# Patient Record
Sex: FEMALE | Race: BLACK OR AFRICAN AMERICAN | NOT HISPANIC OR LATINO | Employment: FULL TIME | URBAN - METROPOLITAN AREA
[De-identification: names, ages, dates, MRNs, and addresses within clinical notes are randomized per-mention and may not be internally consistent; named-entity substitution may affect disease eponyms.]

---

## 2023-03-15 PROBLEM — M79.605 PAIN IN BOTH LOWER EXTREMITIES: Status: ACTIVE | Noted: 2023-03-15

## 2023-03-15 PROBLEM — I10 HYPERTENSION: Status: ACTIVE | Noted: 2023-03-15

## 2023-03-15 PROBLEM — N93.9 ABNORMAL UTERINE BLEEDING: Status: ACTIVE | Noted: 2023-03-15

## 2023-03-15 PROBLEM — R25.2 CALF CRAMP: Status: ACTIVE | Noted: 2023-03-15

## 2023-03-15 PROBLEM — M70.21 OLECRANON BURSITIS OF RIGHT ELBOW: Status: ACTIVE | Noted: 2023-03-15

## 2023-03-15 PROBLEM — R73.9 HYPERGLYCEMIA: Status: ACTIVE | Noted: 2023-03-15

## 2023-03-15 PROBLEM — R07.9 CHEST PAIN: Status: ACTIVE | Noted: 2023-03-15

## 2023-03-15 PROBLEM — E55.9 VITAMIN D DEFICIENCY: Status: ACTIVE | Noted: 2023-03-15

## 2023-03-15 PROBLEM — M50.90 CERVICAL NECK PAIN WITH EVIDENCE OF DISC DISEASE: Status: ACTIVE | Noted: 2023-03-15

## 2023-03-15 PROBLEM — R42 DIZZINESS: Status: ACTIVE | Noted: 2023-03-15

## 2023-03-15 PROBLEM — M54.12 CERVICAL RADICULOPATHY: Status: ACTIVE | Noted: 2023-03-15

## 2023-03-15 PROBLEM — M54.50 LOW BACK PAIN: Status: ACTIVE | Noted: 2023-03-15

## 2023-03-15 PROBLEM — M25.562 BILATERAL KNEE PAIN: Status: ACTIVE | Noted: 2023-03-15

## 2023-03-15 PROBLEM — R51.9 HEADACHE: Status: ACTIVE | Noted: 2023-03-15

## 2023-03-15 PROBLEM — D64.9 ANEMIA: Status: ACTIVE | Noted: 2023-03-15

## 2023-03-15 PROBLEM — G89.29 CHRONIC MIDLINE LOW BACK PAIN: Status: ACTIVE | Noted: 2023-03-15

## 2023-03-15 PROBLEM — J18.9 PNEUMONIA: Status: ACTIVE | Noted: 2023-03-15

## 2023-03-15 PROBLEM — M79.604 PAIN IN BOTH LOWER EXTREMITIES: Status: ACTIVE | Noted: 2023-03-15

## 2023-03-15 PROBLEM — N94.6 DYSMENORRHEA: Status: ACTIVE | Noted: 2023-03-15

## 2023-03-15 PROBLEM — G56.22 ULNAR NEUROPATHY OF LEFT UPPER EXTREMITY: Status: ACTIVE | Noted: 2023-03-15

## 2023-03-15 PROBLEM — I10 BENIGN HYPERTENSION: Status: ACTIVE | Noted: 2023-03-15

## 2023-03-15 PROBLEM — M54.50 CHRONIC MIDLINE LOW BACK PAIN: Status: ACTIVE | Noted: 2023-03-15

## 2023-03-15 PROBLEM — R60.0 BILATERAL LEG EDEMA: Status: ACTIVE | Noted: 2023-03-15

## 2023-03-15 PROBLEM — M25.561 BILATERAL KNEE PAIN: Status: ACTIVE | Noted: 2023-03-15

## 2023-03-15 RX ORDER — CHLORTHALIDONE 25 MG/1
1 TABLET ORAL DAILY
COMMUNITY
Start: 2022-06-20 | End: 2023-07-07 | Stop reason: SDUPTHER

## 2023-03-15 RX ORDER — VIT C/E/ZN/COPPR/LUTEIN/ZEAXAN 250MG-90MG
25 CAPSULE ORAL DAILY
COMMUNITY
End: 2023-07-07 | Stop reason: SDUPTHER

## 2023-03-15 RX ORDER — NAPROXEN 500 MG/1
TABLET ORAL
COMMUNITY
Start: 2020-04-08 | End: 2023-07-07 | Stop reason: SDUPTHER

## 2023-03-15 RX ORDER — NORETHINDRONE 0.35 MG/1
1 TABLET ORAL DAILY
COMMUNITY
Start: 2021-09-30 | End: 2023-07-12 | Stop reason: ALTCHOICE

## 2023-03-16 ENCOUNTER — APPOINTMENT (OUTPATIENT)
Dept: PRIMARY CARE | Facility: CLINIC | Age: 43
End: 2023-03-16
Payer: COMMERCIAL

## 2023-07-07 DIAGNOSIS — M54.40 CHRONIC LOW BACK PAIN WITH SCIATICA, SCIATICA LATERALITY UNSPECIFIED, UNSPECIFIED BACK PAIN LATERALITY: ICD-10-CM

## 2023-07-07 DIAGNOSIS — I10 BENIGN HYPERTENSION: ICD-10-CM

## 2023-07-07 DIAGNOSIS — G89.29 CHRONIC LOW BACK PAIN WITH SCIATICA, SCIATICA LATERALITY UNSPECIFIED, UNSPECIFIED BACK PAIN LATERALITY: ICD-10-CM

## 2023-07-07 DIAGNOSIS — E55.9 VITAMIN D DEFICIENCY: ICD-10-CM

## 2023-07-07 RX ORDER — NAPROXEN 500 MG/1
TABLET ORAL
Qty: 14 TABLET | Refills: 0 | Status: SHIPPED | OUTPATIENT
Start: 2023-07-07 | End: 2024-01-31 | Stop reason: SDUPTHER

## 2023-07-07 RX ORDER — VIT C/E/ZN/COPPR/LUTEIN/ZEAXAN 250MG-90MG
25 CAPSULE ORAL DAILY
Qty: 90 CAPSULE | Refills: 0 | Status: SHIPPED | OUTPATIENT
Start: 2023-07-07

## 2023-07-07 RX ORDER — NAPROXEN 500 MG/1
TABLET ORAL
Qty: 90 TABLET | Refills: 0 | Status: SHIPPED | OUTPATIENT
Start: 2023-07-07 | End: 2023-07-07 | Stop reason: SDUPTHER

## 2023-07-07 RX ORDER — CHLORTHALIDONE 25 MG/1
25 TABLET ORAL DAILY
Qty: 90 TABLET | Refills: 0 | Status: SHIPPED | OUTPATIENT
Start: 2023-07-07 | End: 2023-11-06 | Stop reason: SDUPTHER

## 2023-07-12 ENCOUNTER — OFFICE VISIT (OUTPATIENT)
Dept: PRIMARY CARE | Facility: CLINIC | Age: 43
End: 2023-07-12
Payer: COMMERCIAL

## 2023-07-12 ENCOUNTER — LAB (OUTPATIENT)
Dept: LAB | Facility: LAB | Age: 43
End: 2023-07-12
Payer: COMMERCIAL

## 2023-07-12 VITALS
HEIGHT: 61 IN | WEIGHT: 170 LBS | SYSTOLIC BLOOD PRESSURE: 120 MMHG | DIASTOLIC BLOOD PRESSURE: 80 MMHG | BODY MASS INDEX: 32.1 KG/M2

## 2023-07-12 DIAGNOSIS — I10 BENIGN HYPERTENSION: ICD-10-CM

## 2023-07-12 DIAGNOSIS — E55.9 VITAMIN D DEFICIENCY: ICD-10-CM

## 2023-07-12 DIAGNOSIS — Z00.00 HEALTH MAINTENANCE EXAMINATION: ICD-10-CM

## 2023-07-12 DIAGNOSIS — Z00.00 HEALTH MAINTENANCE EXAMINATION: Primary | ICD-10-CM

## 2023-07-12 DIAGNOSIS — Z12.31 ENCOUNTER FOR SCREENING MAMMOGRAM FOR MALIGNANT NEOPLASM OF BREAST: ICD-10-CM

## 2023-07-12 LAB
ALANINE AMINOTRANSFERASE (SGPT) (U/L) IN SER/PLAS: 19 U/L (ref 7–45)
ALBUMIN (G/DL) IN SER/PLAS: 4.5 G/DL (ref 3.4–5)
ALBUMIN (MG/L) IN URINE: <7 MG/L
ALBUMIN/CREATININE (UG/MG) IN URINE: NORMAL UG/MG CRT (ref 0–30)
ALKALINE PHOSPHATASE (U/L) IN SER/PLAS: 62 U/L (ref 33–110)
ANION GAP IN SER/PLAS: 14 MMOL/L (ref 10–20)
ASPARTATE AMINOTRANSFERASE (SGOT) (U/L) IN SER/PLAS: 17 U/L (ref 9–39)
BILIRUBIN TOTAL (MG/DL) IN SER/PLAS: 0.3 MG/DL (ref 0–1.2)
CALCIUM (MG/DL) IN SER/PLAS: 10 MG/DL (ref 8.6–10.6)
CARBON DIOXIDE, TOTAL (MMOL/L) IN SER/PLAS: 27 MMOL/L (ref 21–32)
CHLORIDE (MMOL/L) IN SER/PLAS: 101 MMOL/L (ref 98–107)
CHOLESTEROL (MG/DL) IN SER/PLAS: 188 MG/DL (ref 0–199)
CHOLESTEROL IN HDL (MG/DL) IN SER/PLAS: 55.1 MG/DL
CHOLESTEROL/HDL RATIO: 3.4
CREATININE (MG/DL) IN SER/PLAS: 0.67 MG/DL (ref 0.5–1.05)
CREATININE (MG/DL) IN URINE: 182 MG/DL (ref 20–320)
ERYTHROCYTE DISTRIBUTION WIDTH (RATIO) BY AUTOMATED COUNT: 12.5 % (ref 11.5–14.5)
ERYTHROCYTE MEAN CORPUSCULAR HEMOGLOBIN CONCENTRATION (G/DL) BY AUTOMATED: 32.8 G/DL (ref 32–36)
ERYTHROCYTE MEAN CORPUSCULAR VOLUME (FL) BY AUTOMATED COUNT: 88 FL (ref 80–100)
ERYTHROCYTES (10*6/UL) IN BLOOD BY AUTOMATED COUNT: 4.69 X10E12/L (ref 4–5.2)
ESTIMATED AVERAGE GLUCOSE FOR HBA1C: 123 MG/DL
GFR FEMALE: >90 ML/MIN/1.73M2
GLUCOSE (MG/DL) IN SER/PLAS: 88 MG/DL (ref 74–99)
HEMATOCRIT (%) IN BLOOD BY AUTOMATED COUNT: 41.1 % (ref 36–46)
HEMOGLOBIN (G/DL) IN BLOOD: 13.5 G/DL (ref 12–16)
HEMOGLOBIN A1C/HEMOGLOBIN TOTAL IN BLOOD: 5.9 %
LDL: 117 MG/DL (ref 0–99)
LEUKOCYTES (10*3/UL) IN BLOOD BY AUTOMATED COUNT: 7 X10E9/L (ref 4.4–11.3)
NRBC (PER 100 WBCS) BY AUTOMATED COUNT: 0 /100 WBC (ref 0–0)
PLATELETS (10*3/UL) IN BLOOD AUTOMATED COUNT: 361 X10E9/L (ref 150–450)
POTASSIUM (MMOL/L) IN SER/PLAS: 3.9 MMOL/L (ref 3.5–5.3)
PROTEIN TOTAL: 7.9 G/DL (ref 6.4–8.2)
SODIUM (MMOL/L) IN SER/PLAS: 138 MMOL/L (ref 136–145)
THYROTROPIN (MIU/L) IN SER/PLAS BY DETECTION LIMIT <= 0.05 MIU/L: 1 MIU/L (ref 0.44–3.98)
TRIGLYCERIDE (MG/DL) IN SER/PLAS: 81 MG/DL (ref 0–149)
UREA NITROGEN (MG/DL) IN SER/PLAS: 8 MG/DL (ref 6–23)
VLDL: 16 MG/DL (ref 0–40)

## 2023-07-12 PROCEDURE — 3079F DIAST BP 80-89 MM HG: CPT

## 2023-07-12 PROCEDURE — 82043 UR ALBUMIN QUANTITATIVE: CPT

## 2023-07-12 PROCEDURE — 80061 LIPID PANEL: CPT

## 2023-07-12 PROCEDURE — 99396 PREV VISIT EST AGE 40-64: CPT

## 2023-07-12 PROCEDURE — 83036 HEMOGLOBIN GLYCOSYLATED A1C: CPT

## 2023-07-12 PROCEDURE — 80053 COMPREHEN METABOLIC PANEL: CPT

## 2023-07-12 PROCEDURE — 1036F TOBACCO NON-USER: CPT

## 2023-07-12 PROCEDURE — 84443 ASSAY THYROID STIM HORMONE: CPT

## 2023-07-12 PROCEDURE — 3074F SYST BP LT 130 MM HG: CPT

## 2023-07-12 PROCEDURE — 82570 ASSAY OF URINE CREATININE: CPT

## 2023-07-12 PROCEDURE — 85027 COMPLETE CBC AUTOMATED: CPT

## 2023-07-12 PROCEDURE — 36415 COLL VENOUS BLD VENIPUNCTURE: CPT

## 2023-07-12 ASSESSMENT — PATIENT HEALTH QUESTIONNAIRE - PHQ9
SUM OF ALL RESPONSES TO PHQ9 QUESTIONS 1 AND 2: 0
2. FEELING DOWN, DEPRESSED OR HOPELESS: NOT AT ALL
SUM OF ALL RESPONSES TO PHQ9 QUESTIONS 1 & 2: 0
2. FEELING DOWN, DEPRESSED OR HOPELESS: NOT AT ALL
1. LITTLE INTEREST OR PLEASURE IN DOING THINGS: NOT AT ALL
1. LITTLE INTEREST OR PLEASURE IN DOING THINGS: NOT AT ALL

## 2023-07-12 NOTE — PROGRESS NOTES
"Subjective   Patient ID: Christi Calzada is a 42 y.o. female who presents for Follow-up.  HPI  42 year old female with PMH of HTN presents today for follow up.     HTN: Chlorthalidone 25mg    Reports that she has chronic insomnia. Not significantly bothered during the day by anxiety but feels it is bothersome during the day. Has tried tylenol PM without significant relief. Has also tried melatonin. Not interested in other medication, CBT, or sleep medicine referral at this time. Does not snore, no significant daytime fatigue, no known apneic periods during night, no waking up SOB, low concern for ANAIS.     All systems have been reviewed and are negative for complaint other than those mentioned in the HPI.     Objective   /80 (BP Location: Left arm, Patient Position: Sitting, BP Cuff Size: Adult)   Ht 1.549 m (5' 1\")   Wt 77.1 kg (170 lb)   BMI 32.12 kg/m²    Physical Exam  Constitutional:       General: She is awake.      Appearance: Normal appearance.   HENT:      Head: Normocephalic and atraumatic.   Eyes:      Extraocular Movements: Extraocular movements intact.      Conjunctiva/sclera: Conjunctivae normal.      Pupils: Pupils are equal, round, and reactive to light.   Neck:      Thyroid: No thyroid mass or thyromegaly.      Vascular: No carotid bruit.   Cardiovascular:      Rate and Rhythm: Normal rate and regular rhythm.      Pulses: Normal pulses.      Heart sounds: S1 normal and S2 normal. No murmur heard.  Pulmonary:      Effort: Pulmonary effort is normal.      Breath sounds: Normal breath sounds.   Abdominal:      General: Abdomen is flat. Bowel sounds are normal.      Palpations: Abdomen is soft. There is no hepatomegaly, splenomegaly, mass or pulsatile mass.      Tenderness: There is no abdominal tenderness.   Musculoskeletal:      Cervical back: Normal range of motion and neck supple.      Right lower leg: No edema.      Left lower leg: No edema.   Skin:     General: Skin is warm and dry.      " Capillary Refill: Capillary refill takes less than 2 seconds.   Neurological:      General: No focal deficit present.      Mental Status: She is alert and oriented to person, place, and time.   Psychiatric:         Mood and Affect: Mood normal.         Behavior: Behavior normal. Behavior is cooperative.         Thought Content: Thought content normal.         Judgment: Judgment normal.     Christi was seen today for follow-up.  Diagnoses and all orders for this visit:  Health maintenance examination (Primary)  -    Due for pap, patient to schedule with OBGYN  - Due for mammo, ordered  - Start colon cancer screening at 46  - Immunizations UTD  - CBC; Future  -     Comprehensive Metabolic Panel; Future  -     Hemoglobin A1C; Future  -     Lipid Panel; Future  -     TSH with reflex to Free T4 if abnormal; Future  -     Albumin, urine, random; Future  Encounter for screening mammogram for malignant neoplasm of breast  -     BI mammo bilateral screening tomosynthesis; Future  Benign hypertension   - Stable, continue current medication.    - CMP and urine albumin today  Vitamin D deficiency   - stable, taking vit D supplement daily, continue.     Follow up in 6 months.

## 2023-08-01 ENCOUNTER — OFFICE VISIT (OUTPATIENT)
Dept: PRIMARY CARE | Facility: CLINIC | Age: 43
End: 2023-08-01
Payer: COMMERCIAL

## 2023-08-01 VITALS
SYSTOLIC BLOOD PRESSURE: 135 MMHG | DIASTOLIC BLOOD PRESSURE: 82 MMHG | WEIGHT: 171 LBS | HEIGHT: 61 IN | BODY MASS INDEX: 32.28 KG/M2

## 2023-08-01 DIAGNOSIS — M25.512 ACUTE PAIN OF LEFT SHOULDER: Primary | ICD-10-CM

## 2023-08-01 DIAGNOSIS — I10 BENIGN HYPERTENSION: ICD-10-CM

## 2023-08-01 PROBLEM — R60.0 BILATERAL LEG EDEMA: Status: RESOLVED | Noted: 2023-03-15 | Resolved: 2023-08-01

## 2023-08-01 PROBLEM — R25.2 CALF CRAMP: Status: RESOLVED | Noted: 2023-03-15 | Resolved: 2023-08-01

## 2023-08-01 PROBLEM — R07.9 CHEST PAIN: Status: RESOLVED | Noted: 2023-03-15 | Resolved: 2023-08-01

## 2023-08-01 PROBLEM — G89.29 CHRONIC MIDLINE LOW BACK PAIN: Status: RESOLVED | Noted: 2023-03-15 | Resolved: 2023-08-01

## 2023-08-01 PROBLEM — M25.562 BILATERAL KNEE PAIN: Status: RESOLVED | Noted: 2023-03-15 | Resolved: 2023-08-01

## 2023-08-01 PROBLEM — M50.90 CERVICAL NECK PAIN WITH EVIDENCE OF DISC DISEASE: Status: RESOLVED | Noted: 2023-03-15 | Resolved: 2023-08-01

## 2023-08-01 PROBLEM — M79.604 PAIN IN BOTH LOWER EXTREMITIES: Status: RESOLVED | Noted: 2023-03-15 | Resolved: 2023-08-01

## 2023-08-01 PROBLEM — M70.21 OLECRANON BURSITIS OF RIGHT ELBOW: Status: RESOLVED | Noted: 2023-03-15 | Resolved: 2023-08-01

## 2023-08-01 PROBLEM — R51.9 HEADACHE: Status: RESOLVED | Noted: 2023-03-15 | Resolved: 2023-08-01

## 2023-08-01 PROBLEM — M54.50 CHRONIC MIDLINE LOW BACK PAIN: Status: RESOLVED | Noted: 2023-03-15 | Resolved: 2023-08-01

## 2023-08-01 PROBLEM — R73.9 HYPERGLYCEMIA: Status: RESOLVED | Noted: 2023-03-15 | Resolved: 2023-08-01

## 2023-08-01 PROBLEM — M79.605 PAIN IN BOTH LOWER EXTREMITIES: Status: RESOLVED | Noted: 2023-03-15 | Resolved: 2023-08-01

## 2023-08-01 PROBLEM — R42 DIZZINESS: Status: RESOLVED | Noted: 2023-03-15 | Resolved: 2023-08-01

## 2023-08-01 PROBLEM — J18.9 PNEUMONIA: Status: RESOLVED | Noted: 2023-03-15 | Resolved: 2023-08-01

## 2023-08-01 PROBLEM — M54.50 LOW BACK PAIN: Status: RESOLVED | Noted: 2023-03-15 | Resolved: 2023-08-01

## 2023-08-01 PROBLEM — M25.561 BILATERAL KNEE PAIN: Status: RESOLVED | Noted: 2023-03-15 | Resolved: 2023-08-01

## 2023-08-01 PROCEDURE — 1036F TOBACCO NON-USER: CPT

## 2023-08-01 PROCEDURE — 99213 OFFICE O/P EST LOW 20 MIN: CPT

## 2023-08-01 PROCEDURE — 3075F SYST BP GE 130 - 139MM HG: CPT

## 2023-08-01 PROCEDURE — 3079F DIAST BP 80-89 MM HG: CPT

## 2023-08-01 RX ORDER — CYCLOBENZAPRINE HCL 10 MG
10 TABLET ORAL NIGHTLY PRN
Qty: 30 TABLET | Refills: 0 | Status: SHIPPED | OUTPATIENT
Start: 2023-08-01 | End: 2024-01-31 | Stop reason: ALTCHOICE

## 2023-08-01 ASSESSMENT — PATIENT HEALTH QUESTIONNAIRE - PHQ9
1. LITTLE INTEREST OR PLEASURE IN DOING THINGS: NOT AT ALL
2. FEELING DOWN, DEPRESSED OR HOPELESS: NOT AT ALL
SUM OF ALL RESPONSES TO PHQ9 QUESTIONS 1 AND 2: 0

## 2023-08-01 NOTE — PROGRESS NOTES
"Subjective   Patient ID: Christi Calzada is a 42 y.o. female who presents for shoulder arm pain recurrent non work reated.  HPI  42 year old female with no significant PMH presents with left shoulder pain.   States that the pain started 2 days ago. Pain is localized in the left shoulder, radiates to left hand. Worse with ROM.   No known history of injury to the shoulder.   History of similar pain occurred 3 weeks ago, lasted 3 days and then resolved.   Took naproxen with good relief.   All systems have been reviewed and are negative for complaint other than those mentioned in the HPI.     Objective   /82 (BP Location: Left arm, Patient Position: Sitting, BP Cuff Size: Adult)   Ht 1.549 m (5' 1\")   Wt 77.6 kg (171 lb)   BMI 32.31 kg/m²    Physical Exam  Constitutional:       General: She is awake.      Appearance: Normal appearance.   HENT:      Head: Normocephalic and atraumatic.   Eyes:      Extraocular Movements: Extraocular movements intact.      Pupils: Pupils are equal, round, and reactive to light.   Cardiovascular:      Rate and Rhythm: Normal rate and regular rhythm.      Heart sounds: S1 normal and S2 normal. No murmur heard.  Pulmonary:      Effort: Pulmonary effort is normal.      Breath sounds: Normal breath sounds.   Musculoskeletal:      Cervical back: Normal range of motion and neck supple.      Right lower leg: No edema.      Left lower leg: No edema.   Skin:     General: Skin is warm and dry.   Neurological:      General: No focal deficit present.      Mental Status: She is alert and oriented to person, place, and time.   Psychiatric:         Mood and Affect: Mood and affect normal.         Behavior: Behavior normal. Behavior is cooperative.         Thought Content: Thought content normal.         Judgment: Judgment normal.     Christi was seen today for shoulder arm pain recurrent non work reated.  Diagnoses and all orders for this visit:  Acute pain of left shoulder (Primary)  - Unable to " perform active ROM abducting above 90 degrees, negative empty can test, likely frozen shoulder  - Atraumatic, though recurrent  - NSAID/muscle relaxer for pain, xray today, referral to ortho placed   -     Referral to Orthopaedic Surgery; Future  -     cyclobenzaprine (Flexeril) 10 mg tablet; Take 1 tablet (10 mg) by mouth as needed at bedtime for muscle spasms.  -     XR shoulder left 2+ views; Future  Benign hypertension   - Slightly above goal but at pain, continue current medication.     Follow up as regularly scheduled for chronic conditions.

## 2023-08-24 ENCOUNTER — HOSPITAL ENCOUNTER (OUTPATIENT)
Dept: DATA CONVERSION | Facility: HOSPITAL | Age: 43
Discharge: HOME | End: 2023-08-24
Payer: COMMERCIAL

## 2023-08-24 DIAGNOSIS — Z20.822 CONTACT WITH AND (SUSPECTED) EXPOSURE TO COVID-19: ICD-10-CM

## 2023-08-24 DIAGNOSIS — M79.10 MYALGIA, UNSPECIFIED SITE: ICD-10-CM

## 2023-08-24 DIAGNOSIS — R51.9 HEADACHE, UNSPECIFIED: ICD-10-CM

## 2023-08-24 DIAGNOSIS — H65.191 OTHER ACUTE NONSUPPURATIVE OTITIS MEDIA, RIGHT EAR: ICD-10-CM

## 2023-08-24 DIAGNOSIS — J02.9 ACUTE PHARYNGITIS, UNSPECIFIED: ICD-10-CM

## 2023-08-24 DIAGNOSIS — R05.9 COUGH, UNSPECIFIED: ICD-10-CM

## 2023-08-24 LAB
ALBUMIN SERPL-MCNC: 4.1 GM/DL (ref 3.5–5)
ALBUMIN/GLOB SERPL: 1 RATIO (ref 1.5–3)
ALP BLD-CCNC: 78 U/L (ref 35–125)
ALT SERPL-CCNC: 25 U/L (ref 5–40)
ANION GAP SERPL CALCULATED.3IONS-SCNC: 9 MMOL/L (ref 0–19)
AST SERPL-CCNC: 18 U/L (ref 5–40)
BACTERIA SPEC CULT: NORMAL
BACTERIA UR QL AUTO: PRESENT
BASOPHILS # BLD AUTO: 0.03 K/UL (ref 0–0.22)
BASOPHILS NFR BLD AUTO: 0.4 % (ref 0–1)
BILIRUB SERPL-MCNC: 0.6 MG/DL (ref 0.1–1.2)
BILIRUB UR QL STRIP.AUTO: NEGATIVE
BUN SERPL-MCNC: 7 MG/DL (ref 8–25)
BUN/CREAT SERPL: 10 RATIO (ref 8–21)
CALCIUM SERPL-MCNC: 9.5 MG/DL (ref 8.5–10.4)
CHLORIDE SERPL-SCNC: 102 MMOL/L (ref 97–107)
CLARITY UR: ABNORMAL
CO2 SERPL-SCNC: 27 MMOL/L (ref 24–31)
COLOR UR: YELLOW
CREAT SERPL-MCNC: 0.7 MG/DL (ref 0.4–1.6)
DEPRECATED RDW RBC AUTO: 38.1 FL (ref 37–54)
DEPRECATED S PYO AG THROAT QL EIA: NORMAL
DIFFERENTIAL METHOD BLD: ABNORMAL
EOSINOPHIL # BLD AUTO: 0.06 K/UL (ref 0–0.45)
EOSINOPHIL NFR BLD: 0.7 % (ref 0–3)
EPITH CASTS #/AREA UR COMP ASSIST: ABNORMAL /HPF
ERYTHROCYTE [DISTWIDTH] IN BLOOD BY AUTOMATED COUNT: 11.9 % (ref 11.7–15)
GFR SERPL CREATININE-BSD FRML MDRD: 111 ML/MIN/1.73 M2
GLOBULIN SER-MCNC: 4 G/DL (ref 1.9–3.7)
GLUCOSE SERPL-MCNC: 103 MG/DL (ref 65–99)
GLUCOSE UR STRIP.AUTO-MCNC: NEGATIVE MG/DL
HCG UR QL: NEGATIVE
HCT VFR BLD AUTO: 39.7 % (ref 36–44)
HGB BLD-MCNC: 13.4 GM/DL (ref 12–15)
HGB UR QL STRIP.AUTO: ABNORMAL /HPF (ref 0–3)
HGB UR QL: ABNORMAL
IMM GRANULOCYTES # BLD AUTO: 0.01 K/UL (ref 0–0.1)
KETONES UR QL STRIP.AUTO: NEGATIVE
LEUKOCYTE ESTERASE UR QL STRIP.AUTO: NEGATIVE
LYMPHOCYTES # BLD AUTO: 1.27 K/UL (ref 1.2–3.2)
LYMPHOCYTES NFR BLD MANUAL: 14.8 % (ref 20–40)
MCH RBC QN AUTO: 29.1 PG (ref 26–34)
MCHC RBC AUTO-ENTMCNC: 33.8 % (ref 31–37)
MCV RBC AUTO: 86.1 FL (ref 80–100)
MICRO URNS: ABNORMAL
MICROSCOPIC (UA): ABNORMAL
MONOCYTES # BLD AUTO: 0.54 K/UL (ref 0–0.8)
MONOCYTES NFR BLD MANUAL: 6.3 % (ref 0–8)
NEUTROPHILS # BLD AUTO: 6.65 K/UL
NEUTROPHILS # BLD AUTO: 6.65 K/UL (ref 1.8–7.7)
NEUTROPHILS.IMMATURE NFR BLD: 0.1 % (ref 0–1)
NEUTS SEG NFR BLD: 77.7 % (ref 50–70)
NITRITE UR QL STRIP.AUTO: NEGATIVE
NOTE (COV): NORMAL
PH UR STRIP.AUTO: 5.5 [PH] (ref 4.6–8)
PLATELET # BLD AUTO: 275 K/UL (ref 150–450)
PMV BLD AUTO: 9 CU (ref 7–12.6)
POTASSIUM SERPL-SCNC: 3 MMOL/L (ref 3.4–5.1)
PROT SERPL-MCNC: 8.1 G/DL (ref 5.9–7.9)
PROT UR STRIP.AUTO-MCNC: ABNORMAL MG/DL
RBC # BLD AUTO: 4.61 M/UL (ref 4–4.9)
REPORT STATUS -LH SQ DATA CONVERSION: NORMAL
SARS-COV-2 RNA RESP QL NAA+PROBE: NEGATIVE
SERVICE CMNT-IMP: NORMAL
SODIUM SERPL-SCNC: 138 MMOL/L (ref 133–145)
SP GR UR STRIP.AUTO: 1.01 (ref 1–1.03)
SPECIMEN SOURCE (COV): NORMAL
SPECIMEN SOURCE: NORMAL
URINE CULTURE: ABNORMAL
UROBILINOGEN UR QL STRIP.AUTO: 1 MG/DL (ref 0–1)
WBC # BLD AUTO: 8.6 K/UL (ref 4.5–11)
WBC #/AREA URNS AUTO: ABNORMAL /HPF (ref 0–3)

## 2023-11-06 DIAGNOSIS — I10 BENIGN HYPERTENSION: ICD-10-CM

## 2023-11-06 RX ORDER — CHLORTHALIDONE 25 MG/1
25 TABLET ORAL DAILY
Qty: 90 TABLET | Refills: 0 | Status: SHIPPED | OUTPATIENT
Start: 2023-11-06 | End: 2024-01-31 | Stop reason: SDUPTHER

## 2024-01-05 ENCOUNTER — HOSPITAL ENCOUNTER (EMERGENCY)
Facility: HOSPITAL | Age: 44
Discharge: OTHER INSTITUTION | End: 2024-01-05
Payer: COMMERCIAL

## 2024-01-05 PROCEDURE — 4500999001 HC ED NO CHARGE

## 2024-01-19 ENCOUNTER — OFFICE VISIT (OUTPATIENT)
Dept: OBSTETRICS AND GYNECOLOGY | Facility: CLINIC | Age: 44
End: 2024-01-19
Payer: COMMERCIAL

## 2024-01-19 VITALS
SYSTOLIC BLOOD PRESSURE: 122 MMHG | WEIGHT: 167 LBS | HEIGHT: 61 IN | DIASTOLIC BLOOD PRESSURE: 71 MMHG | BODY MASS INDEX: 31.53 KG/M2

## 2024-01-19 DIAGNOSIS — Z30.09 CONTRACEPTIVE EDUCATION: ICD-10-CM

## 2024-01-19 DIAGNOSIS — Z01.419 ENCOUNTER FOR ANNUAL ROUTINE GYNECOLOGICAL EXAMINATION: Primary | ICD-10-CM

## 2024-01-19 PROCEDURE — 3078F DIAST BP <80 MM HG: CPT | Performed by: STUDENT IN AN ORGANIZED HEALTH CARE EDUCATION/TRAINING PROGRAM

## 2024-01-19 PROCEDURE — 88142 CYTOPATH C/V THIN LAYER: CPT

## 2024-01-19 PROCEDURE — 87661 TRICHOMONAS VAGINALIS AMPLIF: CPT

## 2024-01-19 PROCEDURE — 3074F SYST BP LT 130 MM HG: CPT | Performed by: STUDENT IN AN ORGANIZED HEALTH CARE EDUCATION/TRAINING PROGRAM

## 2024-01-19 PROCEDURE — 87624 HPV HI-RISK TYP POOLED RSLT: CPT

## 2024-01-19 PROCEDURE — 1036F TOBACCO NON-USER: CPT | Performed by: STUDENT IN AN ORGANIZED HEALTH CARE EDUCATION/TRAINING PROGRAM

## 2024-01-19 PROCEDURE — 87800 DETECT AGNT MULT DNA DIREC: CPT

## 2024-01-19 PROCEDURE — 99213 OFFICE O/P EST LOW 20 MIN: CPT | Performed by: STUDENT IN AN ORGANIZED HEALTH CARE EDUCATION/TRAINING PROGRAM

## 2024-01-19 RX ORDER — DROSPIRENONE AND ETHINYL ESTRADIOL 0.02-3(28)
1 KIT ORAL DAILY
Qty: 28 TABLET | Refills: 11 | Status: SHIPPED | OUTPATIENT
Start: 2024-01-19 | End: 2025-01-18

## 2024-01-19 ASSESSMENT — PAIN SCALES - GENERAL: PAINLEVEL: 0-NO PAIN

## 2024-01-19 ASSESSMENT — ENCOUNTER SYMPTOMS
EYES NEGATIVE: 0
ENDOCRINE NEGATIVE: 0
CARDIOVASCULAR NEGATIVE: 0
ALLERGIC/IMMUNOLOGIC NEGATIVE: 0
HEMATOLOGIC/LYMPHATIC NEGATIVE: 0
MUSCULOSKELETAL NEGATIVE: 0
PSYCHIATRIC NEGATIVE: 0
RESPIRATORY NEGATIVE: 0
NEUROLOGICAL NEGATIVE: 0
GASTROINTESTINAL NEGATIVE: 0
CONSTITUTIONAL NEGATIVE: 0

## 2024-01-19 ASSESSMENT — PATIENT HEALTH QUESTIONNAIRE - PHQ9
SUM OF ALL RESPONSES TO PHQ9 QUESTIONS 1 AND 2: 0
1. LITTLE INTEREST OR PLEASURE IN DOING THINGS: NOT AT ALL
2. FEELING DOWN, DEPRESSED OR HOPELESS: NOT AT ALL

## 2024-01-19 NOTE — PROGRESS NOTES
Subjective   Patient ID: Christi Calzada is a 43 y.o. female who presents for Annual Exam (Pt here for Annual Exam. Last Pap 1/17/20 wnl. Mammogram Exp 1/8/24.).  Patient here for annual visits with no complaints.  Patient is planning a trip to [place] next month and would prefer to not be bleeding at that time.  Would like to initiate oral contraceptive pills for this purpose.        Review of Systems   All other systems reviewed and are negative.      Objective   Physical Exam  Exam conducted with a chaperone present.   Constitutional:       General: She is not in acute distress.     Appearance: Normal appearance. She is not ill-appearing.   Pulmonary:      Effort: Pulmonary effort is normal.   Chest:   Breasts:     Breasts are symmetrical.      Right: Normal. No swelling, bleeding, inverted nipple, mass, nipple discharge, skin change or tenderness.      Left: Normal. No swelling, bleeding, inverted nipple, mass, nipple discharge, skin change or tenderness.   Abdominal:      General: There is no distension.      Palpations: Abdomen is soft. There is no mass.      Tenderness: There is no abdominal tenderness. There is no guarding or rebound.   Genitourinary:     General: Normal vulva.      Comments: Normal external female genitalia with normal urethra and anus  Vagina pink with rugae and physiologic discharge  Cervix soft and nontender without lesion  Uterus nontender with normal size shape and contour  Adnexa nontender with no palpable masses  Musculoskeletal:      Right lower leg: No edema.      Left lower leg: No edema.   Lymphadenopathy:      Upper Body:      Right upper body: No supraclavicular, axillary or pectoral adenopathy.      Left upper body: No supraclavicular, axillary or pectoral adenopathy.   Neurological:      Mental Status: She is alert.         Assessment/Plan   Diagnoses and all orders for this visit:  Encounter for annual routine gynecological examination  -     THINPREP PAP TEST  Contraceptive  education  -     drospirenone-ethinyl estradioL (Darshana, Adam) 3-0.02 mg tablet; Take 1 tablet by mouth once daily.    Patient here for routine GYN exam which was within normal limits.  Patient also desires initiation of oral contraceptive pills so she can be amenorrheic during her trip to [place] next month.  Instructed patient to start Darshana today take the first 21 pills once per day and then take the 4 placebo pills which time she should have her withdrawal bleed afterwards she should then finish the hormonal pills in the pack and begin a new pack of yes.  Patient to follow-up after her trip.       Jean Clemente MD 01/19/24 3:39 PM

## 2024-01-19 NOTE — LETTER
Start taking your Birth control pills today. Take the first 21 pills with hormones and then on the 22nd day start taking the non-hormonal  (white) pills. Once the 4 white pills are done. Take the remaining 3 hormonal pills and and the start a new pack.    Please don't hesitate to call the office with questions.    Best,     Dr. Clemente

## 2024-01-23 LAB
C TRACH RRNA SPEC QL NAA+PROBE: NEGATIVE
N GONORRHOEA DNA SPEC QL PROBE+SIG AMP: NEGATIVE
T VAGINALIS RRNA SPEC QL NAA+PROBE: NEGATIVE

## 2024-01-31 ENCOUNTER — OFFICE VISIT (OUTPATIENT)
Dept: PRIMARY CARE | Facility: CLINIC | Age: 44
End: 2024-01-31
Payer: COMMERCIAL

## 2024-01-31 ENCOUNTER — LAB (OUTPATIENT)
Dept: LAB | Facility: LAB | Age: 44
End: 2024-01-31
Payer: COMMERCIAL

## 2024-01-31 VITALS — SYSTOLIC BLOOD PRESSURE: 130 MMHG | BODY MASS INDEX: 31.18 KG/M2 | WEIGHT: 165 LBS | DIASTOLIC BLOOD PRESSURE: 60 MMHG

## 2024-01-31 DIAGNOSIS — I10 BENIGN HYPERTENSION: Primary | ICD-10-CM

## 2024-01-31 DIAGNOSIS — I10 BENIGN HYPERTENSION: ICD-10-CM

## 2024-01-31 DIAGNOSIS — G89.29 CHRONIC LOW BACK PAIN WITH SCIATICA, SCIATICA LATERALITY UNSPECIFIED, UNSPECIFIED BACK PAIN LATERALITY: ICD-10-CM

## 2024-01-31 DIAGNOSIS — M54.40 CHRONIC LOW BACK PAIN WITH SCIATICA, SCIATICA LATERALITY UNSPECIFIED, UNSPECIFIED BACK PAIN LATERALITY: ICD-10-CM

## 2024-01-31 LAB
ALBUMIN SERPL BCP-MCNC: 4.4 G/DL (ref 3.4–5)
ALP SERPL-CCNC: 55 U/L (ref 33–110)
ALT SERPL W P-5'-P-CCNC: 29 U/L (ref 7–45)
ANION GAP SERPL CALC-SCNC: 14 MMOL/L (ref 10–20)
AST SERPL W P-5'-P-CCNC: 17 U/L (ref 9–39)
BILIRUB SERPL-MCNC: 0.2 MG/DL (ref 0–1.2)
BUN SERPL-MCNC: 12 MG/DL (ref 6–23)
CALCIUM SERPL-MCNC: 10.1 MG/DL (ref 8.6–10.6)
CHLORIDE SERPL-SCNC: 99 MMOL/L (ref 98–107)
CO2 SERPL-SCNC: 30 MMOL/L (ref 21–32)
CREAT SERPL-MCNC: 0.67 MG/DL (ref 0.5–1.05)
CREAT UR-MCNC: 83 MG/DL (ref 20–320)
EGFRCR SERPLBLD CKD-EPI 2021: >90 ML/MIN/1.73M*2
GLUCOSE SERPL-MCNC: 79 MG/DL (ref 74–99)
MICROALBUMIN UR-MCNC: <7 MG/L
MICROALBUMIN/CREAT UR: NORMAL MG/G{CREAT}
POTASSIUM SERPL-SCNC: 3.6 MMOL/L (ref 3.5–5.3)
PROT SERPL-MCNC: 8.1 G/DL (ref 6.4–8.2)
SODIUM SERPL-SCNC: 139 MMOL/L (ref 136–145)

## 2024-01-31 PROCEDURE — 80053 COMPREHEN METABOLIC PANEL: CPT

## 2024-01-31 PROCEDURE — 36415 COLL VENOUS BLD VENIPUNCTURE: CPT

## 2024-01-31 PROCEDURE — 99214 OFFICE O/P EST MOD 30 MIN: CPT | Performed by: INTERNAL MEDICINE

## 2024-01-31 PROCEDURE — 82043 UR ALBUMIN QUANTITATIVE: CPT

## 2024-01-31 PROCEDURE — 1036F TOBACCO NON-USER: CPT | Performed by: INTERNAL MEDICINE

## 2024-01-31 PROCEDURE — 82570 ASSAY OF URINE CREATININE: CPT

## 2024-01-31 PROCEDURE — 3075F SYST BP GE 130 - 139MM HG: CPT | Performed by: INTERNAL MEDICINE

## 2024-01-31 PROCEDURE — 3078F DIAST BP <80 MM HG: CPT | Performed by: INTERNAL MEDICINE

## 2024-01-31 RX ORDER — NAPROXEN 500 MG/1
TABLET ORAL
Qty: 30 TABLET | Refills: 0 | Status: SHIPPED | OUTPATIENT
Start: 2024-01-31

## 2024-01-31 RX ORDER — CHLORTHALIDONE 25 MG/1
25 TABLET ORAL DAILY
Qty: 90 TABLET | Refills: 0 | Status: SHIPPED | OUTPATIENT
Start: 2024-01-31

## 2024-01-31 ASSESSMENT — ENCOUNTER SYMPTOMS
CARDIOVASCULAR NEGATIVE: 1
RESPIRATORY NEGATIVE: 1
CONSTITUTIONAL NEGATIVE: 1
GASTROINTESTINAL NEGATIVE: 1

## 2024-01-31 ASSESSMENT — PATIENT HEALTH QUESTIONNAIRE - PHQ9
1. LITTLE INTEREST OR PLEASURE IN DOING THINGS: NOT AT ALL
SUM OF ALL RESPONSES TO PHQ9 QUESTIONS 1 AND 2: 0
2. FEELING DOWN, DEPRESSED OR HOPELESS: NOT AT ALL

## 2024-01-31 NOTE — PATIENT INSTRUCTIONS
By optimizing your health through good nutrition, daily exercise, stress management, low/moderate alcohol and avoidance of tobacco, sugar and processed foods, you can help to decrease your risk of cardiovascular disease and stroke and achieve a healthy weight. A diet rich in whole, plant-based foods such as vegetables, beans, seeds, nuts, whole grains, healthy fats and fruit - leads to lower body mass index, blood pressure, HbA1C, and cholesterol levels.     Heart Healthy Tips:     -We recommend you follow a heart healthy diet. Watch food labels and try not to  eat more than 2,500 mg of sodium per day. Avoid foods high in salt like  processed meats (lunch meats, guerrero, and sausage), processed foods (boxed  dinners, canned soups), fried and fast foods. Monitor serving sizes and if the  sodium per serving size is more than 200 mg, avoid those foods. If the sodium  per serving size is between 100-200 mg, you can use those in limited quantities.  Try to choose foods where the amount of sodium per serving size is less than 100  mg. Try to eat a diet rich in fruits and vegetables, whole grains, low fat dairy  products, skinless poultry and fish, nuts, beans, non-tropical vegetable oils.  Limit saturated fat, trans fat, sodium, red meats, and sugar-sweetened  beverages. Limit alcohol    -The combination of a reduced-calorie diet and increased physical activity is  recommended. Adults should aim to get at least 150 minutes of moderate physical  activity per week (30 minutes of moderate physical activities at least 5 days  per week). Examples of moderate physical activities include brisk walking,  swimming, aerobic dancing, heavy gardening, jumping rope, bicycling 10 MPH or  faster, tennis, hiking uphill or with a heavy backpack. Please let us know if  you would like to learn more about your nutrition and calories and additional  options including weight loss programs to help you reach your goal.     -If you smoke, stop  smoking. If you stop smoking you can help get rid of a major  source of stress to your heart. Smoking makes your heart rate and blood pressure  go up and increases your risk or developing cardiovascular diseases and worsen  symptoms associated with heart failure.     -Obtain a BP monitor and monitor your BP daily. Check it around the same time  each day; at least 1 hour after taking your medications. Record your BP in a log  and bring your log with you to your doctor?s appointment.

## 2024-01-31 NOTE — PROGRESS NOTES
Chief Complaint:   Chief Complaint   Patient presents with    Follow-up    Med Refill      HPI    Christi Calzada is a 43 y.o. year old female who presents to the clinic for a follow up    Past Medical History   Past Medical History:   Diagnosis Date    Acute lymphadenitis, unspecified 2014    Lymphadenitis, acute    Body mass index (BMI) 34.0-34.9, adult 2022    BMI 34.0-34.9,adult    Body mass index (BMI)30.0-30.9, adult 2021    Body mass index (BMI) of 30.0 to 30.9 in adult    Candidiasis, unspecified 2014    Yeast infection    Encounter for screening for lipoid disorders 2014    Lipid screening    Encounter for screening for malignant neoplasm of vagina     Vaginal Pap smear    Other conditions influencing health status     History of pregnancy    Other conditions influencing health status     Menstruation    Pain in right knee 2018    Bilateral knee pain    Paresthesia of skin 2014    Paresthesia of foot    Personal history of other diseases of the nervous system and sense organs 2013    History of earache    Radiculopathy, cervical region 2018    Cervical radiculopathy      Past Surgical History:   Past Surgical History:   Procedure Laterality Date     SECTION, CLASSIC  2014     Section    HERNIA REPAIR  2014    Hernia Repair    OTHER SURGICAL HISTORY  2020    Hysteroscopy    OTHER SURGICAL HISTORY  2020    Dilation and curettage    OTHER SURGICAL HISTORY  2014    Oophorectomy Unilateral Right Side     Family History:   Family History   Problem Relation Name Age of Onset    Migraines Father      Asthma Son      Stroke Paternal Grandfather      Diabetes Other Grandfather      Social History:   Tobacco Use: Low Risk  (2024)    Patient History     Smoking Tobacco Use: Never     Smokeless Tobacco Use: Never     Passive Exposure: Not on file      Social History     Substance and Sexual Activity   Alcohol Use Not  "Currently        Allergies:   No Known Allergies     ROS   Review of Systems   Constitutional: Negative.    Respiratory: Negative.     Cardiovascular: Negative.    Gastrointestinal: Negative.         Objective   Vitals:    01/31/24 1441   BP: 130/60      BMI Readings from Last 15 Encounters:   01/31/24 31.18 kg/m²   01/19/24 31.55 kg/m²   08/01/23 32.31 kg/m²   07/12/23 32.12 kg/m²   06/20/22 33.07 kg/m²   03/16/22 33.63 kg/m²   02/07/22 34.01 kg/m²   01/25/21 30.23 kg/m²   05/22/20 31.93 kg/m²   05/06/20 30.28 kg/m²   04/14/20 30.23 kg/m²   01/16/20 32.50 kg/m²      74.8 kg (165 lb) (1/31/2024  2:41 PM)      Physical Exam  Physical Exam  Constitutional:       Appearance: She is well-developed.   Cardiovascular:      Rate and Rhythm: Normal rate and regular rhythm.      Heart sounds: Normal heart sounds. No murmur heard.  Pulmonary:      Effort: Pulmonary effort is normal.      Breath sounds: Normal breath sounds.   Abdominal:      General: Bowel sounds are normal.      Palpations: Abdomen is soft.          Labs:  Lab Results   Component Value Date    WBC 8.6 08/24/2023    HGB 13.4 08/24/2023    HCT 39.7 08/24/2023    MCV 86.1 08/24/2023     08/24/2023     Lab Results   Component Value Date    GLUCOSE 103 (H) 08/24/2023    CALCIUM 9.5 08/24/2023     08/24/2023    K 3.0 (L) 08/24/2023    CO2 27 08/24/2023     08/24/2023    BUN 7 (L) 08/24/2023    CREATININE 0.7 08/24/2023         No components found for: \"URINE ALBUMIN\"  Lab Results   Component Value Date    HGBA1C 5.9 (A) 07/12/2023      Lab Results   Component Value Date    HGBA1C 5.9 (A) 07/12/2023    HGBA1C 5.6 02/07/2022     Lab Results   Component Value Date    TSH 1.00 07/12/2023       Current Medications:  Current Outpatient Medications   Medication Sig Dispense Refill    chlorthalidone (Hygroton) 25 mg tablet Take 1 tablet (25 mg) by mouth once daily. 90 tablet 0    cholecalciferol (Vitamin D3) 25 MCG (1000 UT) capsule Take 1 capsule (25 " mcg) by mouth once daily. 90 capsule 0    drospirenone-ethinyl estradioL (Darshana, Gianvi) 3-0.02 mg tablet Take 1 tablet by mouth once daily. 28 tablet 11    naproxen (Naprosyn) 500 mg tablet Naproxen 500 MG Oral Tablet  Quantity: 14  Refills: 0    Start : 8-Apr-2020 Active 30 tablet 0     No current facility-administered medications for this visit.       Assessment and Plan  Christi was seen today for follow-up and med refill.  Diagnoses and all orders for this visit:  Benign hypertension (Primary)  -     chlorthalidone (Hygroton) 25 mg tablet; Take 1 tablet (25 mg) by mouth once daily.  -     Comprehensive metabolic panel; Future  -     Albumin, urine, random; Future  Chronic low back pain with sciatica, sciatica laterality unspecified, unspecified back pain laterality  -     naproxen (Naprosyn) 500 mg tablet; Naproxen 500 MG Oral Tablet  Quantity: 14  Refills: 0    Start : 8-Apr-2020 Active      HTN.  Well controlled.  Continue with current medications.  Check BP at home daily.  Report to us if the numbers are higher than 130/80.    Occasional back pain and knee pain  Take Naproxen only as needed, don't take it daily    Repeat CMP and urine albumin    Follow up with PCP      Immunizations:  Immunization History   Administered Date(s) Administered    Influenza, seasonal, injectable 01/10/2015    Pfizer Purple Cap SARS-CoV-2 12/02/2021    SARS-CoV-2, Unspecified 03/18/2022    Td (adult) 12/08/2012    Tdap vaccine, age 7 year and older (BOOSTRIX, ADACEL) 06/16/2018     Please follow up in

## 2024-02-01 LAB
CYTOLOGY CMNT CVX/VAG CYTO-IMP: NORMAL
HPV HR 12 DNA GENITAL QL NAA+PROBE: NEGATIVE
HPV HR GENOTYPES PNL CVX NAA+PROBE: NEGATIVE
HPV16 DNA SPEC QL NAA+PROBE: NEGATIVE
HPV18 DNA SPEC QL NAA+PROBE: NEGATIVE
LAB AP HPV GENOTYPE QUESTION: YES
LAB AP HPV HR: NORMAL
LAB AP PAP ADDITIONAL TESTS: NORMAL
LABORATORY COMMENT REPORT: NORMAL
LABORATORY COMMENT REPORT: NORMAL
LMP START DATE: NORMAL
PATH REPORT.TOTAL CANCER: NORMAL

## 2024-08-09 ENCOUNTER — TELEMEDICINE (OUTPATIENT)
Dept: PRIMARY CARE | Facility: CLINIC | Age: 44
End: 2024-08-09
Payer: COMMERCIAL

## 2024-08-09 VITALS
BODY MASS INDEX: 31.53 KG/M2 | WEIGHT: 167 LBS | DIASTOLIC BLOOD PRESSURE: 70 MMHG | SYSTOLIC BLOOD PRESSURE: 133 MMHG | HEIGHT: 61 IN

## 2024-08-09 DIAGNOSIS — G47.00 INSOMNIA, UNSPECIFIED TYPE: ICD-10-CM

## 2024-08-09 DIAGNOSIS — E66.09 CLASS 1 OBESITY DUE TO EXCESS CALORIES WITH SERIOUS COMORBIDITY AND BODY MASS INDEX (BMI) OF 31.0 TO 31.9 IN ADULT: ICD-10-CM

## 2024-08-09 DIAGNOSIS — Z12.31 ENCOUNTER FOR SCREENING MAMMOGRAM FOR MALIGNANT NEOPLASM OF BREAST: Primary | ICD-10-CM

## 2024-08-09 DIAGNOSIS — E55.9 VITAMIN D DEFICIENCY: ICD-10-CM

## 2024-08-09 DIAGNOSIS — I10 BENIGN HYPERTENSION: ICD-10-CM

## 2024-08-09 PROCEDURE — 3075F SYST BP GE 130 - 139MM HG: CPT

## 2024-08-09 PROCEDURE — 3008F BODY MASS INDEX DOCD: CPT

## 2024-08-09 PROCEDURE — 99214 OFFICE O/P EST MOD 30 MIN: CPT

## 2024-08-09 PROCEDURE — 3078F DIAST BP <80 MM HG: CPT

## 2024-08-09 RX ORDER — VIT C/E/ZN/COPPR/LUTEIN/ZEAXAN 250MG-90MG
25 CAPSULE ORAL DAILY
Qty: 90 CAPSULE | Refills: 0 | Status: SHIPPED | OUTPATIENT
Start: 2024-08-09

## 2024-08-09 RX ORDER — HYDROXYZINE HYDROCHLORIDE 25 MG/1
25 TABLET, FILM COATED ORAL 3 TIMES DAILY
Qty: 90 TABLET | Refills: 2 | Status: SHIPPED | OUTPATIENT
Start: 2024-08-09 | End: 2024-11-07

## 2024-08-09 RX ORDER — CHLORTHALIDONE 25 MG/1
25 TABLET ORAL DAILY
Qty: 90 TABLET | Refills: 0 | Status: SHIPPED | OUTPATIENT
Start: 2024-08-09

## 2024-08-09 NOTE — PROGRESS NOTES
Subjective   Patient ID: Christi Calzada is a 43 y.o. female who presents via virtual visit for Follow-up.  An interactive audio and video telecommunication system which permits real time communications between the patient (at the originating site) and provider (at the distant site) was utilized to provide this telehealth service.    Verbal consent was requested and obtained from the patient on the day of encounter.  HPI  43 year old female with PMH of pre-diabetes, HTN, vit D def resents via virtual visit for follow up.     HTN: chlorthalidone 25mg     BP at goal at home, 120/80s.   Reporting insomnia, difficulty falling asleep and stayin gasleep.     All systems have been reviewed and are negative for complaint other than those mentioned in the HPI.     Objective   Physical Exam  Constitutional:       Appearance: Normal appearance.   Pulmonary:      Effort: Pulmonary effort is normal.   Neurological:      General: No focal deficit present.      Mental Status: She is alert and oriented to person, place, and time.   Psychiatric:         Mood and Affect: Mood normal.         Behavior: Behavior normal.      Christi was seen today for follow-up.  Diagnoses and all orders for this visit:  Encounter for screening mammogram for malignant neoplasm of breast (Primary)  -     Due for mammo, ordered  - BI mammo bilateral screening tomosynthesis; Future  Benign hypertension  -     Stable, continue current medicaiton   - chlorthalidone (Hygroton) 25 mg tablet; Take 1 tablet (25 mg) by mouth once daily.  Vitamin D deficiency  -     Stable, continue current medication   - cholecalciferol (Vitamin D3) 25 MCG (1000 UT) capsule; Take 1 capsule (25 mcg) by mouth once daily.  Class 1 obesity due to excess calories with serious comorbidity and body mass index (BMI) of 31.0 to 31.9 in adult  -    Interested in GLP-1, discussed with patient that it is not covered under her insurance plan  - Referral to nutrition placed  -  Referral to  Nutrition Services; Future  Insomnia, unspecified type  -     hydrOXYzine HCL (Atarax) 25 mg tablet; Take 1 tablet (25 mg) by mouth 3 times a day.    Follow up in 3 months for CPE.

## 2024-10-11 PROBLEM — E66.811 CLASS 1 OBESITY: Status: ACTIVE | Noted: 2024-10-11

## 2024-10-11 NOTE — PROGRESS NOTES
"Reason for Nutrition Visit:  Pt is a 43 y.o. female referred for   1. Class 1 obesity           Pt was referred by Karis Quan CNP on 8/9/24.      Past Medical Hx:  Patient Active Problem List   Diagnosis    Abnormal uterine bleeding    Anemia    Benign hypertension    Cervical radiculopathy    Chronic low back pain with sciatica    Dysmenorrhea    Ulnar neuropathy of left upper extremity    Vitamin D deficiency    Class 1 obesity        Food and Nutrition Hx:    24 Diet Recall:  [facility]  Meal 1:  Meal 2:  Meal 3:  Snacks:  Beverages:  BED    Weight change:    Significant Weight Change: No  CW: 167#  BMI: 31.6  UBW:  Wt HX:  Wt Readings from Last 10 Encounters:   08/09/24 75.8 kg (167 lb)   01/31/24 74.8 kg (165 lb)   01/19/24 75.8 kg (167 lb)   08/01/23 77.6 kg (171 lb)   07/12/23 77.1 kg (170 lb)   06/20/22 79.4 kg (175 lb)   03/16/22 80.7 kg (178 lb)   02/07/22 81.6 kg (180 lb)   01/25/21 72.6 kg (160 lb)   05/22/20 76.7 kg (169 lb)          RFP trend:   Recent Labs     01/31/24  1455 08/24/23  1053 07/12/23  1502 02/07/22  0947 12/18/21  0923   GLUCOSE 79 103* 88   < > 94    138 138   < > 137   K 3.6 3.0* 3.9   < > 3.9   CL 99 102 101   < > 101   CO2 30 27 27   < > 23*   ANIONGAP 14 9 14   < > 13   BUN 12 7* 8   < > 6*   CREATININE 0.67 0.7 0.67   < > 0.6   EGFR >90 111  --   --  118   CALCIUM 10.1 9.5 10.0   < > 9.3   ALBUMIN 4.4 4.1 4.5   < > 4.5    < > = values in this interval not displayed.   , Lipid Panel trend:    Recent Labs     07/12/23  1502 02/07/22  0947   CHOL 188 179   HDL 55.1 52.0   LDLF 117* 116*   VLDL 16 11   TRIG 81 56   , Liver trend:   Recent Labs     01/31/24  1455 08/24/23  1053 07/12/23  1502   ALKPHOS 55 78 62   AST 17 18 17   ALT 29 25 19   BILITOT 0.2 0.6 0.3   , Hgb A1c trend:   Recent Labs     07/12/23  1502   HGBA1C 5.9*   , Vit D: No results found for: \"VITD25\" , and Iron Panel: No results found for: \"IRON\", \"TIBC\", \"FERRITIN\"        {Food Preparation:50449}  {Cooking " Skills/Barriers:25149}  {Grocery Shoppin}        {Allergies:36251}  {Intolerance:78739}  {Appetite:37695}  {NUMBER:57917}  {Intake:50498}  {GI Symptoms (Optional):27434} {Frequency:47734}  {Swallowing Difficulty:96863}  {Dentition (Optional):76888}    {Eating Out Type:53807} {FREQUENCY:62328}  {Convenience Foods:58016} {FREQUENCY:92434}    {Types of Activities:79119}  {Duration:95260} {FREQUENCY:01160}    {Sleep duration/quality (Optional):14925}  {Sleep disorders:15545}    {Supplements:51645} {FREQUENCY:26734}      Nutrition Focused Physical Exam:    Performed/Deferred: Deferred as pt visually appears well-nourished with no signs of malnutrition      Malnutrition Present: No    Estimated Energy Needs:    {Weight Maintanence:20209}  {Weight Loss Needs:56713}  {Weight Gain Needs:28445}    {Estimated Needs:32756}      Nutrition Diagnosis:    Diagnosis Statement 1:  Diagnosis Status: New  Diagnosis : Altered nutrition related lab values  related to organ dysfunction that leads to biochemical changs as evidenced by  elevated A1c of 5.9% and LDL of 117 mg/dl although labs are over 1 year old    Diagnosis Statement 2:  {Diagnosis Status:30892}  {Diagnosis (Optional):97477} related to {etiologies JBL:26882} as evidenced by {Signs/Symptoms:83629}      Nutrition Interventions:    1)     We reviewed the importance of having consistent meals and snacks throughout the day to improve or maintain good glycemic control and to increase metabolism to aid with weight loss. Pt should aim to consume a meal or snack every 3-4 hours which contains a lean protein (lean chicken, turkey, fish, eggs, low fat yogurt, nuts, peanut butter, bean) and healthy starch (fruits, whole grains)    2) We discussed the importance of following a heart healthy which is a low saturated fat, low cholesterol, low sodium diet. Choose foods with less than 5 grams (g) of total fat per serving. For someone who needs to eat 2,000 calories per day, 50 g to  75 g per day is a good range. Try to pick foods with heart-healthy fats (monounsaturated and polyunsaturated fats). Choose foods with less than 2 g per serving of saturated fat and 0 g of trans fat. (Saturated fat and trans fat are not heart-healthy.) A person who needs to eat 2,000 calories per day should eat no more than 11 g to 15 g of saturated fat in one day. Read ingredients listed on the label. If a food contains partially hydrogenated oils, then it has trans fat. (If it has less than half a gram per serving, the label may still say trans fat-free.)      Nutrition Goals:  {Nutrition Goals (Optional):38898}    Nutrition Recommendations:  1)  Re-check A1c and lipid panel      {Educational Handouts JBL:54753}

## 2024-10-14 ENCOUNTER — TELEPHONE (OUTPATIENT)
Dept: NUTRITION | Facility: HOSPITAL | Age: 44
End: 2024-10-14
Payer: COMMERCIAL

## 2024-10-14 ENCOUNTER — APPOINTMENT (OUTPATIENT)
Dept: NUTRITION | Facility: CLINIC | Age: 44
End: 2024-10-14
Payer: COMMERCIAL

## 2024-10-14 DIAGNOSIS — E66.811 CLASS 1 OBESITY: Primary | ICD-10-CM

## 2024-10-31 ENCOUNTER — OFFICE VISIT (OUTPATIENT)
Dept: URGENT CARE | Age: 44
End: 2024-10-31
Payer: COMMERCIAL

## 2024-10-31 VITALS
OXYGEN SATURATION: 96 % | TEMPERATURE: 99.1 F | HEART RATE: 98 BPM | WEIGHT: 167 LBS | RESPIRATION RATE: 16 BRPM | DIASTOLIC BLOOD PRESSURE: 91 MMHG | SYSTOLIC BLOOD PRESSURE: 145 MMHG | BODY MASS INDEX: 31.55 KG/M2

## 2024-10-31 DIAGNOSIS — Z20.822 COVID-19 RULED OUT: Primary | ICD-10-CM

## 2024-10-31 DIAGNOSIS — J06.9 VIRAL URI: ICD-10-CM

## 2024-10-31 DIAGNOSIS — I10 BENIGN HYPERTENSION: ICD-10-CM

## 2024-10-31 DIAGNOSIS — R05.9 COUGH, UNSPECIFIED TYPE: ICD-10-CM

## 2024-10-31 DIAGNOSIS — G47.00 INSOMNIA, UNSPECIFIED TYPE: ICD-10-CM

## 2024-10-31 LAB
POC RAPID INFLUENZA A: NEGATIVE
POC RAPID INFLUENZA B: NEGATIVE
POC RAPID STREP: NEGATIVE
POC SARS-COV-2 AG BINAX: NORMAL

## 2024-10-31 PROCEDURE — 87880 STREP A ASSAY W/OPTIC: CPT | Performed by: EMERGENCY MEDICINE

## 2024-10-31 PROCEDURE — 3080F DIAST BP >= 90 MM HG: CPT | Performed by: EMERGENCY MEDICINE

## 2024-10-31 PROCEDURE — 87811 SARS-COV-2 COVID19 W/OPTIC: CPT | Performed by: EMERGENCY MEDICINE

## 2024-10-31 PROCEDURE — 87804 INFLUENZA ASSAY W/OPTIC: CPT | Performed by: EMERGENCY MEDICINE

## 2024-10-31 PROCEDURE — 3077F SYST BP >= 140 MM HG: CPT | Performed by: EMERGENCY MEDICINE

## 2024-10-31 PROCEDURE — 99203 OFFICE O/P NEW LOW 30 MIN: CPT | Performed by: EMERGENCY MEDICINE

## 2024-10-31 PROCEDURE — RXMED WILLOW AMBULATORY MEDICATION CHARGE

## 2024-10-31 PROCEDURE — 1036F TOBACCO NON-USER: CPT | Performed by: EMERGENCY MEDICINE

## 2024-10-31 RX ORDER — CHLORTHALIDONE 25 MG/1
25 TABLET ORAL DAILY
Qty: 90 TABLET | Refills: 0 | Status: SHIPPED | OUTPATIENT
Start: 2024-10-31

## 2024-10-31 ASSESSMENT — ENCOUNTER SYMPTOMS
COUGH: 1
SORE THROAT: 1

## 2024-10-31 ASSESSMENT — PAIN SCALES - GENERAL: PAINLEVEL_OUTOF10: 7

## 2024-11-05 DIAGNOSIS — Z30.09 CONTRACEPTIVE EDUCATION: ICD-10-CM

## 2024-11-06 ENCOUNTER — PHARMACY VISIT (OUTPATIENT)
Dept: PHARMACY | Facility: CLINIC | Age: 44
End: 2024-11-06
Payer: COMMERCIAL

## 2024-11-07 ENCOUNTER — TELEPHONE (OUTPATIENT)
Dept: OBSTETRICS AND GYNECOLOGY | Facility: CLINIC | Age: 44
End: 2024-11-07
Payer: COMMERCIAL

## 2024-11-07 PROCEDURE — RXMED WILLOW AMBULATORY MEDICATION CHARGE

## 2024-11-07 RX ORDER — DROSPIRENONE AND ETHINYL ESTRADIOL 0.02-3(28)
1 KIT ORAL DAILY
Qty: 84 TABLET | Refills: 3 | Status: SHIPPED | OUTPATIENT
Start: 2024-11-07 | End: 2025-11-07

## 2024-11-11 ENCOUNTER — PHARMACY VISIT (OUTPATIENT)
Dept: PHARMACY | Facility: CLINIC | Age: 44
End: 2024-11-11
Payer: COMMERCIAL

## 2024-11-22 ENCOUNTER — APPOINTMENT (OUTPATIENT)
Dept: RADIOLOGY | Facility: CLINIC | Age: 44
End: 2024-11-22
Payer: COMMERCIAL

## 2024-12-19 ENCOUNTER — APPOINTMENT (OUTPATIENT)
Dept: RADIOLOGY | Facility: CLINIC | Age: 44
End: 2024-12-19
Payer: COMMERCIAL

## 2024-12-30 DIAGNOSIS — G47.00 INSOMNIA, UNSPECIFIED TYPE: ICD-10-CM

## 2024-12-30 RX ORDER — HYDROXYZINE HYDROCHLORIDE 25 MG/1
25 TABLET, FILM COATED ORAL 3 TIMES DAILY
Qty: 90 TABLET | Refills: 2 | Status: SHIPPED | OUTPATIENT
Start: 2024-12-30 | End: 2025-03-30

## 2025-01-24 ENCOUNTER — APPOINTMENT (OUTPATIENT)
Dept: RADIOLOGY | Facility: CLINIC | Age: 45
End: 2025-01-24
Payer: COMMERCIAL

## 2025-02-03 ENCOUNTER — APPOINTMENT (OUTPATIENT)
Dept: PRIMARY CARE | Facility: CLINIC | Age: 45
End: 2025-02-03
Payer: COMMERCIAL

## 2025-02-04 ENCOUNTER — APPOINTMENT (OUTPATIENT)
Dept: PRIMARY CARE | Facility: CLINIC | Age: 45
End: 2025-02-04
Payer: COMMERCIAL

## 2025-02-10 ENCOUNTER — APPOINTMENT (OUTPATIENT)
Dept: RADIOLOGY | Facility: CLINIC | Age: 45
End: 2025-02-10
Payer: COMMERCIAL

## 2025-02-14 ENCOUNTER — APPOINTMENT (OUTPATIENT)
Dept: PRIMARY CARE | Facility: CLINIC | Age: 45
End: 2025-02-14
Payer: COMMERCIAL

## 2025-02-14 VITALS
WEIGHT: 165 LBS | HEIGHT: 61 IN | BODY MASS INDEX: 31.15 KG/M2 | HEART RATE: 105 BPM | SYSTOLIC BLOOD PRESSURE: 125 MMHG | DIASTOLIC BLOOD PRESSURE: 77 MMHG

## 2025-02-14 DIAGNOSIS — Z00.00 HEALTHCARE MAINTENANCE: ICD-10-CM

## 2025-02-14 DIAGNOSIS — I10 BENIGN HYPERTENSION: Primary | ICD-10-CM

## 2025-02-14 DIAGNOSIS — F41.9 ANXIETY: ICD-10-CM

## 2025-02-14 DIAGNOSIS — E55.9 VITAMIN D DEFICIENCY: ICD-10-CM

## 2025-02-14 DIAGNOSIS — G47.09 OTHER INSOMNIA: ICD-10-CM

## 2025-02-14 PROCEDURE — 99213 OFFICE O/P EST LOW 20 MIN: CPT | Performed by: INTERNAL MEDICINE

## 2025-02-14 PROCEDURE — 1036F TOBACCO NON-USER: CPT | Performed by: INTERNAL MEDICINE

## 2025-02-14 PROCEDURE — 3008F BODY MASS INDEX DOCD: CPT | Performed by: INTERNAL MEDICINE

## 2025-02-14 PROCEDURE — 99396 PREV VISIT EST AGE 40-64: CPT | Performed by: INTERNAL MEDICINE

## 2025-02-14 PROCEDURE — 3078F DIAST BP <80 MM HG: CPT | Performed by: INTERNAL MEDICINE

## 2025-02-14 PROCEDURE — 3074F SYST BP LT 130 MM HG: CPT | Performed by: INTERNAL MEDICINE

## 2025-02-14 PROCEDURE — RXMED WILLOW AMBULATORY MEDICATION CHARGE

## 2025-02-14 RX ORDER — CHLORTHALIDONE 25 MG/1
25 TABLET ORAL DAILY
Qty: 90 TABLET | Refills: 1 | Status: SHIPPED | OUTPATIENT
Start: 2025-02-14

## 2025-02-14 RX ORDER — TRAZODONE HYDROCHLORIDE 50 MG/1
50 TABLET ORAL NIGHTLY PRN
Qty: 30 TABLET | Refills: 5 | Status: SHIPPED | OUTPATIENT
Start: 2025-02-14 | End: 2026-02-14

## 2025-02-14 RX ORDER — VIT C/E/ZN/COPPR/LUTEIN/ZEAXAN 250MG-90MG
25 CAPSULE ORAL DAILY
Qty: 90 CAPSULE | Refills: 1 | Status: SHIPPED | OUTPATIENT
Start: 2025-02-14

## 2025-02-14 RX ORDER — HYDROXYZINE HYDROCHLORIDE 25 MG/1
25 TABLET, FILM COATED ORAL EVERY 8 HOURS PRN
Qty: 90 TABLET | Refills: 2 | Status: SHIPPED | OUTPATIENT
Start: 2025-02-14 | End: 2025-05-15

## 2025-02-14 ASSESSMENT — LIFESTYLE VARIABLES
HOW OFTEN DO YOU HAVE SIX OR MORE DRINKS ON ONE OCCASION: NEVER
AUDIT-C TOTAL SCORE: 1
HOW OFTEN DO YOU HAVE A DRINK CONTAINING ALCOHOL: MONTHLY OR LESS
HOW MANY STANDARD DRINKS CONTAINING ALCOHOL DO YOU HAVE ON A TYPICAL DAY: 1 OR 2
SKIP TO QUESTIONS 9-10: 1

## 2025-02-14 ASSESSMENT — ENCOUNTER SYMPTOMS
ABDOMINAL PAIN: 0
CHEST TIGHTNESS: 0
SHORTNESS OF BREATH: 0

## 2025-02-14 NOTE — PROGRESS NOTES
"Subjective   Patient ID: Christi Calzada is a 44 y.o. female who presents for Annual Exam.    She has also had a hard time going to sleep. 9 pm to 11 pm comes home and goes to bed, lays until 2 am to get to sleep, gets up once at night to go to the bathroom. Has tried Tylenol pm, Benadryl, hydroxyzine and melatonin 10mg. Says she has once tried \"6 Benadryls\" to sleep. Go to work at 7:30 am works from home. Tired during the day and cant wait to get to bed. No one family has sleep apnea. She denies snoring.  No naps during the day, works 2 jobs.   Grand mother passed in last 6 months. PHQ-2 of 1.            Review of Systems   Respiratory:  Negative for chest tightness and shortness of breath.    Cardiovascular:  Negative for chest pain and leg swelling.   Gastrointestinal:  Negative for abdominal pain.   All other systems reviewed and are negative.      Objective   /77 (BP Location: Right arm, Patient Position: Sitting, BP Cuff Size: Adult)   Pulse 105   Ht 1.549 m (5' 1\")   Wt 74.8 kg (165 lb)   BMI 31.18 kg/m²     Physical Exam  Vitals reviewed.   Constitutional:       General: She is not in acute distress.  HENT:      Head: Normocephalic and atraumatic.   Eyes:      Extraocular Movements: Extraocular movements intact.      Conjunctiva/sclera: Conjunctivae normal.   Cardiovascular:      Rate and Rhythm: Normal rate and regular rhythm.      Heart sounds: No murmur heard.     No friction rub. No gallop.   Pulmonary:      Effort: Pulmonary effort is normal.      Breath sounds: Normal breath sounds. No wheezing, rhonchi or rales.   Abdominal:      General: Bowel sounds are normal.      Palpations: Abdomen is soft. There is no mass.      Tenderness: There is no abdominal tenderness. There is no guarding or rebound.   Musculoskeletal:         General: No tenderness.      Cervical back: Neck supple.      Right lower leg: No edema.      Left lower leg: No edema.   Skin:     General: Skin is warm and dry.      " Findings: No bruising or rash.   Neurological:      Mental Status: She is alert. Mental status is at baseline.      Comments: Answering questions, following commands. Moving all extremities.    Psychiatric:         Mood and Affect: Mood and affect normal.         Assessment/Plan   Problem List Items Addressed This Visit       Benign hypertension - Primary     125/77 today  Continue chlorthalidone 25          Relevant Medications    chlorthalidone (Hygroton) 25 mg tablet    Other Relevant Orders    CBC    Comprehensive metabolic panel    TSH with reflex to Free T4 if abnormal    Albumin-Creatinine Ratio, Urine Random    Vitamin D deficiency    Relevant Medications    cholecalciferol (Vitamin D3) 25 MCG (1000 UT) capsule    Other Relevant Orders    Vitamin D 25-Hydroxy,Total (for eval of Vitamin D levels)    Other insomnia     STOP BANG 2, though suspect will have to rule out sleep apnea.   Start trazodone 50 for possible depressive episode vs insomnia. Pt informed to not take with other medications that cause drowsiness when starting out, such as with hydroxyzine, Benadryl, etc.   Refer to sleep medicine for further evaluation          Relevant Medications    traZODone (Desyrel) 50 mg tablet    hydrOXYzine HCL (Atarax) 25 mg tablet    Other Relevant Orders    Referral to Adult Sleep Medicine    Healthcare maintenance    Relevant Orders    TSH with reflex to Free T4 if abnormal    Lipid Panel    Hemoglobin A1c    Anxiety     Pt reports prn hydroxyzine has helped with anxiety during the day            Relevant Medications    hydrOXYzine HCL (Atarax) 25 mg tablet     #health Maintenance  Mammogram - last 2022, due and is scheduled.   PAP - 1/2024 negative cytology and HPV   Labs: order today    Immunizations: flu shot can get at pharmacy, tetanus vaccine 6/2018

## 2025-02-14 NOTE — ASSESSMENT & PLAN NOTE
STOP BANG 2, though suspect will have to rule out sleep apnea.   Start trazodone 50 for possible depressive episode vs insomnia. Pt informed to not take with other medications that cause drowsiness when starting out, such as with hydroxyzine, Benadryl, etc.   Refer to sleep medicine for further evaluation

## 2025-02-14 NOTE — PROGRESS NOTES
I reviewed the resident/fellow's documentation and discussed the patient with the resident/fellow. I agree with the resident/fellow's medical decision making as documented in the note.  As the attending physician,  I reviewed the relevant imaging studies and available reports. I also discussed the differential diagnosis and all of the proposed management plans with the resident.    Corinna Begum MD

## 2025-02-20 ENCOUNTER — PHARMACY VISIT (OUTPATIENT)
Dept: PHARMACY | Facility: CLINIC | Age: 45
End: 2025-02-20
Payer: COMMERCIAL

## 2025-02-25 ENCOUNTER — HOSPITAL ENCOUNTER (OUTPATIENT)
Dept: RADIOLOGY | Facility: CLINIC | Age: 45
Discharge: HOME | End: 2025-02-25
Payer: COMMERCIAL

## 2025-02-25 VITALS — WEIGHT: 164.9 LBS | BODY MASS INDEX: 31.13 KG/M2 | HEIGHT: 61 IN

## 2025-02-25 DIAGNOSIS — Z12.31 ENCOUNTER FOR SCREENING MAMMOGRAM FOR MALIGNANT NEOPLASM OF BREAST: ICD-10-CM

## 2025-02-25 PROCEDURE — 77067 SCR MAMMO BI INCL CAD: CPT

## 2025-02-25 PROCEDURE — 77067 SCR MAMMO BI INCL CAD: CPT | Performed by: STUDENT IN AN ORGANIZED HEALTH CARE EDUCATION/TRAINING PROGRAM

## 2025-02-25 PROCEDURE — 77063 BREAST TOMOSYNTHESIS BI: CPT | Performed by: STUDENT IN AN ORGANIZED HEALTH CARE EDUCATION/TRAINING PROGRAM

## 2025-03-05 ENCOUNTER — HOSPITAL ENCOUNTER (OUTPATIENT)
Facility: HOSPITAL | Age: 45
Setting detail: OBSERVATION
Discharge: HOME | End: 2025-03-06
Attending: EMERGENCY MEDICINE | Admitting: INTERNAL MEDICINE
Payer: COMMERCIAL

## 2025-03-05 ENCOUNTER — APPOINTMENT (OUTPATIENT)
Dept: RADIOLOGY | Facility: HOSPITAL | Age: 45
End: 2025-03-05
Payer: COMMERCIAL

## 2025-03-05 ENCOUNTER — APPOINTMENT (OUTPATIENT)
Dept: CARDIOLOGY | Facility: HOSPITAL | Age: 45
End: 2025-03-05
Payer: COMMERCIAL

## 2025-03-05 DIAGNOSIS — R00.0 TACHYCARDIA: Primary | ICD-10-CM

## 2025-03-05 DIAGNOSIS — E05.90 HYPERTHYROIDISM: ICD-10-CM

## 2025-03-05 LAB
ALBUMIN SERPL BCP-MCNC: 4 G/DL (ref 3.4–5)
ALP SERPL-CCNC: 60 U/L (ref 33–110)
ALT SERPL W P-5'-P-CCNC: 36 U/L (ref 7–45)
ANION GAP SERPL CALC-SCNC: 14 MMOL/L (ref 10–20)
APPEARANCE UR: CLEAR
AST SERPL W P-5'-P-CCNC: 29 U/L (ref 9–39)
BASOPHILS # BLD AUTO: 0.02 X10*3/UL (ref 0–0.1)
BASOPHILS NFR BLD AUTO: 0.4 %
BILIRUB SERPL-MCNC: 0.6 MG/DL (ref 0–1.2)
BILIRUB UR STRIP.AUTO-MCNC: NEGATIVE MG/DL
BUN SERPL-MCNC: 15 MG/DL (ref 6–23)
CALCIUM SERPL-MCNC: 10.5 MG/DL (ref 8.6–10.3)
CARDIAC TROPONIN I PNL SERPL HS: 3 NG/L (ref 0–13)
CHLORIDE SERPL-SCNC: 100 MMOL/L (ref 98–107)
CO2 SERPL-SCNC: 28 MMOL/L (ref 21–32)
COLOR UR: YELLOW
CREAT SERPL-MCNC: 0.43 MG/DL (ref 0.5–1.05)
EGFRCR SERPLBLD CKD-EPI 2021: >90 ML/MIN/1.73M*2
EOSINOPHIL # BLD AUTO: 0.03 X10*3/UL (ref 0–0.7)
EOSINOPHIL NFR BLD AUTO: 0.6 %
ERYTHROCYTE [DISTWIDTH] IN BLOOD BY AUTOMATED COUNT: 11.9 % (ref 11.5–14.5)
FLUAV RNA RESP QL NAA+PROBE: NOT DETECTED
FLUBV RNA RESP QL NAA+PROBE: NOT DETECTED
GLUCOSE SERPL-MCNC: 93 MG/DL (ref 74–99)
GLUCOSE UR STRIP.AUTO-MCNC: NORMAL MG/DL
HCG UR QL IA.RAPID: NEGATIVE
HCT VFR BLD AUTO: 38.3 % (ref 36–46)
HGB BLD-MCNC: 13 G/DL (ref 12–16)
IMM GRANULOCYTES # BLD AUTO: 0.01 X10*3/UL (ref 0–0.7)
IMM GRANULOCYTES NFR BLD AUTO: 0.2 % (ref 0–0.9)
KETONES UR STRIP.AUTO-MCNC: NEGATIVE MG/DL
LEUKOCYTE ESTERASE UR QL STRIP.AUTO: NEGATIVE
LYMPHOCYTES # BLD AUTO: 1.91 X10*3/UL (ref 1.2–4.8)
LYMPHOCYTES NFR BLD AUTO: 37.7 %
MAGNESIUM SERPL-MCNC: 1.4 MG/DL (ref 1.6–2.4)
MCH RBC QN AUTO: 27.4 PG (ref 26–34)
MCHC RBC AUTO-ENTMCNC: 33.9 G/DL (ref 32–36)
MCV RBC AUTO: 81 FL (ref 80–100)
MONOCYTES # BLD AUTO: 0.5 X10*3/UL (ref 0.1–1)
MONOCYTES NFR BLD AUTO: 9.9 %
MUCOUS THREADS #/AREA URNS AUTO: NORMAL /LPF
NEUTROPHILS # BLD AUTO: 2.6 X10*3/UL (ref 1.2–7.7)
NEUTROPHILS NFR BLD AUTO: 51.2 %
NITRITE UR QL STRIP.AUTO: NEGATIVE
NRBC BLD-RTO: 0 /100 WBCS (ref 0–0)
PH UR STRIP.AUTO: 6 [PH]
PLATELET # BLD AUTO: 319 X10*3/UL (ref 150–450)
POTASSIUM SERPL-SCNC: 3.4 MMOL/L (ref 3.5–5.3)
PROT SERPL-MCNC: 7.8 G/DL (ref 6.4–8.2)
PROT UR STRIP.AUTO-MCNC: NORMAL MG/DL
RBC # BLD AUTO: 4.75 X10*6/UL (ref 4–5.2)
RBC # UR STRIP.AUTO: NEGATIVE MG/DL
RBC #/AREA URNS AUTO: NORMAL /HPF
RSV RNA RESP QL NAA+PROBE: NOT DETECTED
SARS-COV-2 RNA RESP QL NAA+PROBE: NOT DETECTED
SODIUM SERPL-SCNC: 139 MMOL/L (ref 136–145)
SP GR UR STRIP.AUTO: 1.03
SQUAMOUS #/AREA URNS AUTO: NORMAL /HPF
UROBILINOGEN UR STRIP.AUTO-MCNC: NORMAL MG/DL
WBC # BLD AUTO: 5.1 X10*3/UL (ref 4.4–11.3)
WBC #/AREA URNS AUTO: NORMAL /HPF

## 2025-03-05 PROCEDURE — 84439 ASSAY OF FREE THYROXINE: CPT | Performed by: EMERGENCY MEDICINE

## 2025-03-05 PROCEDURE — 2500000004 HC RX 250 GENERAL PHARMACY W/ HCPCS (ALT 636 FOR OP/ED): Performed by: EMERGENCY MEDICINE

## 2025-03-05 PROCEDURE — 81001 URINALYSIS AUTO W/SCOPE: CPT | Performed by: EMERGENCY MEDICINE

## 2025-03-05 PROCEDURE — 80053 COMPREHEN METABOLIC PANEL: CPT | Performed by: NURSE PRACTITIONER

## 2025-03-05 PROCEDURE — 84443 ASSAY THYROID STIM HORMONE: CPT | Performed by: EMERGENCY MEDICINE

## 2025-03-05 PROCEDURE — 71046 X-RAY EXAM CHEST 2 VIEWS: CPT | Performed by: RADIOLOGY

## 2025-03-05 PROCEDURE — 2500000001 HC RX 250 WO HCPCS SELF ADMINISTERED DRUGS (ALT 637 FOR MEDICARE OP): Performed by: EMERGENCY MEDICINE

## 2025-03-05 PROCEDURE — 96361 HYDRATE IV INFUSION ADD-ON: CPT

## 2025-03-05 PROCEDURE — 84484 ASSAY OF TROPONIN QUANT: CPT | Performed by: NURSE PRACTITIONER

## 2025-03-05 PROCEDURE — 83735 ASSAY OF MAGNESIUM: CPT | Performed by: NURSE PRACTITIONER

## 2025-03-05 PROCEDURE — 36415 COLL VENOUS BLD VENIPUNCTURE: CPT | Performed by: NURSE PRACTITIONER

## 2025-03-05 PROCEDURE — 87637 SARSCOV2&INF A&B&RSV AMP PRB: CPT | Performed by: NURSE PRACTITIONER

## 2025-03-05 PROCEDURE — 2500000004 HC RX 250 GENERAL PHARMACY W/ HCPCS (ALT 636 FOR OP/ED): Performed by: NURSE PRACTITIONER

## 2025-03-05 PROCEDURE — 71046 X-RAY EXAM CHEST 2 VIEWS: CPT

## 2025-03-05 PROCEDURE — 81025 URINE PREGNANCY TEST: CPT | Performed by: EMERGENCY MEDICINE

## 2025-03-05 PROCEDURE — 96366 THER/PROPH/DIAG IV INF ADDON: CPT

## 2025-03-05 PROCEDURE — 96375 TX/PRO/DX INJ NEW DRUG ADDON: CPT

## 2025-03-05 PROCEDURE — 93005 ELECTROCARDIOGRAM TRACING: CPT

## 2025-03-05 PROCEDURE — 87636 SARSCOV2 & INF A&B AMP PRB: CPT | Performed by: NURSE PRACTITIONER

## 2025-03-05 PROCEDURE — 96365 THER/PROPH/DIAG IV INF INIT: CPT

## 2025-03-05 PROCEDURE — 85025 COMPLETE CBC W/AUTO DIFF WBC: CPT | Performed by: NURSE PRACTITIONER

## 2025-03-05 PROCEDURE — 99285 EMERGENCY DEPT VISIT HI MDM: CPT | Mod: 25 | Performed by: EMERGENCY MEDICINE

## 2025-03-05 RX ORDER — POTASSIUM CHLORIDE 1.5 G/1.58G
20 POWDER, FOR SOLUTION ORAL 2 TIMES DAILY
Status: DISCONTINUED | OUTPATIENT
Start: 2025-03-05 | End: 2025-03-06 | Stop reason: HOSPADM

## 2025-03-05 RX ORDER — DIPHENHYDRAMINE HYDROCHLORIDE 50 MG/ML
25 INJECTION INTRAMUSCULAR; INTRAVENOUS ONCE
Status: COMPLETED | OUTPATIENT
Start: 2025-03-05 | End: 2025-03-05

## 2025-03-05 RX ORDER — METOCLOPRAMIDE HYDROCHLORIDE 5 MG/ML
10 INJECTION INTRAMUSCULAR; INTRAVENOUS ONCE
Status: COMPLETED | OUTPATIENT
Start: 2025-03-05 | End: 2025-03-05

## 2025-03-05 RX ORDER — KETOROLAC TROMETHAMINE 30 MG/ML
15 INJECTION, SOLUTION INTRAMUSCULAR; INTRAVENOUS ONCE
Status: COMPLETED | OUTPATIENT
Start: 2025-03-05 | End: 2025-03-05

## 2025-03-05 RX ORDER — MAGNESIUM SULFATE HEPTAHYDRATE 40 MG/ML
2 INJECTION, SOLUTION INTRAVENOUS ONCE
Status: COMPLETED | OUTPATIENT
Start: 2025-03-05 | End: 2025-03-06

## 2025-03-05 RX ADMIN — MAGNESIUM SULFATE HEPTAHYDRATE 2 G: 40 INJECTION, SOLUTION INTRAVENOUS at 22:26

## 2025-03-05 RX ADMIN — DIPHENHYDRAMINE HYDROCHLORIDE 25 MG: 50 INJECTION, SOLUTION INTRAMUSCULAR; INTRAVENOUS at 22:46

## 2025-03-05 RX ADMIN — SODIUM CHLORIDE 1000 ML: 9 INJECTION, SOLUTION INTRAVENOUS at 22:45

## 2025-03-05 RX ADMIN — METOCLOPRAMIDE 10 MG: 5 INJECTION, SOLUTION INTRAMUSCULAR; INTRAVENOUS at 22:46

## 2025-03-05 RX ADMIN — SODIUM CHLORIDE 1000 ML: 9 INJECTION, SOLUTION INTRAVENOUS at 19:23

## 2025-03-05 RX ADMIN — KETOROLAC TROMETHAMINE 15 MG: 30 INJECTION, SOLUTION INTRAMUSCULAR at 17:39

## 2025-03-05 RX ADMIN — POTASSIUM CHLORIDE 20 MEQ: 1.5 POWDER, FOR SOLUTION ORAL at 22:26

## 2025-03-05 ASSESSMENT — PAIN DESCRIPTION - ONSET: ONSET: GRADUAL

## 2025-03-05 ASSESSMENT — PAIN DESCRIPTION - FREQUENCY: FREQUENCY: CONSTANT/CONTINUOUS

## 2025-03-05 ASSESSMENT — PAIN DESCRIPTION - LOCATION: LOCATION: HEAD

## 2025-03-05 ASSESSMENT — PAIN SCALES - GENERAL: PAINLEVEL_OUTOF10: 9

## 2025-03-05 ASSESSMENT — PAIN DESCRIPTION - DESCRIPTORS: DESCRIPTORS: THROBBING

## 2025-03-05 ASSESSMENT — PAIN DESCRIPTION - PROGRESSION: CLINICAL_PROGRESSION: GRADUALLY WORSENING

## 2025-03-05 ASSESSMENT — PAIN DESCRIPTION - PAIN TYPE: TYPE: ACUTE PAIN

## 2025-03-05 ASSESSMENT — PAIN - FUNCTIONAL ASSESSMENT: PAIN_FUNCTIONAL_ASSESSMENT: 0-10

## 2025-03-05 NOTE — ED TRIAGE NOTES
This patient was seen in triage.     Vitals are noted.      HPI:  Patient is a 44-year-old female presenting to the emergency department with shortness of breath, lightheadedness, headache ongoing for the last week.  Denies any cough, abdominal pain, nausea, vomiting, does endorse some diarrhea today.  Reports decreased appetite.  Denies any exposure to any sick contacts.  Shortness of breath is worse with exertion.  Denies any hemoptysis, unilateral leg pain or swelling.    Focused PE:  Gen: Well-appearing, not in acute distress  Cardiovascular: Regular rate, normal rhythm, no murmur, no gallop  Respiratory: No adventitious lung sounds auscultated.  Abdomen: No reproducible abdominal tenderness upon palpation,  physical exam may be limited by patient positioning sitting up in a chair  Neuro:  Alert and Oriented, speech clear and coherent     Plan:  IV, lab work, EKG, imaging        For the remainder of the patient's workup and ED course, please see the main ED provider note.  We discussed need for diagnostic testing including lab studies and imaging.  We also discussed that they may be asked to wait in the waiting room while these test are pending.  They understand that if they choose to leave without having the testing completed or resulted that we cannot rule out acute life-threatening illnesses and the risks involved to lead to worsening condition, permanent disability or even death.

## 2025-03-06 ENCOUNTER — PHARMACY VISIT (OUTPATIENT)
Dept: PHARMACY | Facility: CLINIC | Age: 45
End: 2025-03-06
Payer: COMMERCIAL

## 2025-03-06 ENCOUNTER — APPOINTMENT (OUTPATIENT)
Dept: RADIOLOGY | Facility: HOSPITAL | Age: 45
End: 2025-03-06
Payer: COMMERCIAL

## 2025-03-06 VITALS
HEIGHT: 61 IN | HEART RATE: 101 BPM | SYSTOLIC BLOOD PRESSURE: 113 MMHG | WEIGHT: 165 LBS | OXYGEN SATURATION: 98 % | RESPIRATION RATE: 12 BRPM | TEMPERATURE: 98.1 F | BODY MASS INDEX: 31.15 KG/M2 | DIASTOLIC BLOOD PRESSURE: 60 MMHG

## 2025-03-06 PROBLEM — R00.0 TACHYCARDIA: Status: ACTIVE | Noted: 2025-03-06

## 2025-03-06 LAB
ANION GAP SERPL CALC-SCNC: 12 MMOL/L (ref 10–20)
ATRIAL RATE: 112 BPM
BUN SERPL-MCNC: 15 MG/DL (ref 6–23)
CALCIUM SERPL-MCNC: 9.3 MG/DL (ref 8.6–10.3)
CARDIAC TROPONIN I PNL SERPL HS: 4 NG/L (ref 0–13)
CHLORIDE SERPL-SCNC: 108 MMOL/L (ref 98–107)
CO2 SERPL-SCNC: 25 MMOL/L (ref 21–32)
CREAT SERPL-MCNC: 0.36 MG/DL (ref 0.5–1.05)
D DIMER PPP FEU-MCNC: 298 NG/ML FEU
EGFRCR SERPLBLD CKD-EPI 2021: >90 ML/MIN/1.73M*2
ERYTHROCYTE [DISTWIDTH] IN BLOOD BY AUTOMATED COUNT: 12 % (ref 11.5–14.5)
GLUCOSE SERPL-MCNC: 90 MG/DL (ref 74–99)
HCT VFR BLD AUTO: 32.8 % (ref 36–46)
HGB BLD-MCNC: 11.2 G/DL (ref 12–16)
MAGNESIUM SERPL-MCNC: 1.64 MG/DL (ref 1.6–2.4)
MCH RBC QN AUTO: 27.7 PG (ref 26–34)
MCHC RBC AUTO-ENTMCNC: 34.1 G/DL (ref 32–36)
MCV RBC AUTO: 81 FL (ref 80–100)
NRBC BLD-RTO: 0 /100 WBCS (ref 0–0)
P AXIS: 62 DEGREES
P OFFSET: 198 MS
P ONSET: 150 MS
PLATELET # BLD AUTO: 238 X10*3/UL (ref 150–450)
POTASSIUM SERPL-SCNC: 3.9 MMOL/L (ref 3.5–5.3)
PR INTERVAL: 138 MS
Q ONSET: 219 MS
QRS COUNT: 18 BEATS
QRS DURATION: 76 MS
QT INTERVAL: 348 MS
QTC CALCULATION(BAZETT): 475 MS
QTC FREDERICIA: 428 MS
R AXIS: 72 DEGREES
RBC # BLD AUTO: 4.05 X10*6/UL (ref 4–5.2)
SODIUM SERPL-SCNC: 141 MMOL/L (ref 136–145)
T AXIS: 20 DEGREES
T OFFSET: 393 MS
T3FREE SERPL-MCNC: >10 PG/ML (ref 2.3–4.2)
T4 FREE SERPL-MCNC: 5.34 NG/DL (ref 0.61–1.12)
TSH SERPL-ACNC: <0.01 MIU/L (ref 0.44–3.98)
VENTRICULAR RATE: 112 BPM
WBC # BLD AUTO: 4.1 X10*3/UL (ref 4.4–11.3)

## 2025-03-06 PROCEDURE — 83735 ASSAY OF MAGNESIUM: CPT | Performed by: INTERNAL MEDICINE

## 2025-03-06 PROCEDURE — 84481 FREE ASSAY (FT-3): CPT | Mod: AHULAB | Performed by: NURSE PRACTITIONER

## 2025-03-06 PROCEDURE — 99234 HOSP IP/OBS SM DT SF/LOW 45: CPT | Performed by: NURSE PRACTITIONER

## 2025-03-06 PROCEDURE — 2500000001 HC RX 250 WO HCPCS SELF ADMINISTERED DRUGS (ALT 637 FOR MEDICARE OP): Performed by: INTERNAL MEDICINE

## 2025-03-06 PROCEDURE — 36415 COLL VENOUS BLD VENIPUNCTURE: CPT | Performed by: EMERGENCY MEDICINE

## 2025-03-06 PROCEDURE — 85027 COMPLETE CBC AUTOMATED: CPT | Performed by: INTERNAL MEDICINE

## 2025-03-06 PROCEDURE — G0378 HOSPITAL OBSERVATION PER HR: HCPCS

## 2025-03-06 PROCEDURE — 80048 BASIC METABOLIC PNL TOTAL CA: CPT | Performed by: INTERNAL MEDICINE

## 2025-03-06 PROCEDURE — 70450 CT HEAD/BRAIN W/O DYE: CPT | Performed by: RADIOLOGY

## 2025-03-06 PROCEDURE — 2500000001 HC RX 250 WO HCPCS SELF ADMINISTERED DRUGS (ALT 637 FOR MEDICARE OP): Performed by: EMERGENCY MEDICINE

## 2025-03-06 PROCEDURE — 36415 COLL VENOUS BLD VENIPUNCTURE: CPT | Performed by: INTERNAL MEDICINE

## 2025-03-06 PROCEDURE — 83520 IMMUNOASSAY QUANT NOS NONAB: CPT | Performed by: NURSE PRACTITIONER

## 2025-03-06 PROCEDURE — 2500000001 HC RX 250 WO HCPCS SELF ADMINISTERED DRUGS (ALT 637 FOR MEDICARE OP): Performed by: NURSE PRACTITIONER

## 2025-03-06 PROCEDURE — 85379 FIBRIN DEGRADATION QUANT: CPT | Performed by: EMERGENCY MEDICINE

## 2025-03-06 PROCEDURE — 84484 ASSAY OF TROPONIN QUANT: CPT | Performed by: NURSE PRACTITIONER

## 2025-03-06 PROCEDURE — 70450 CT HEAD/BRAIN W/O DYE: CPT

## 2025-03-06 PROCEDURE — 2500000004 HC RX 250 GENERAL PHARMACY W/ HCPCS (ALT 636 FOR OP/ED): Performed by: INTERNAL MEDICINE

## 2025-03-06 PROCEDURE — RXMED WILLOW AMBULATORY MEDICATION CHARGE

## 2025-03-06 PROCEDURE — 96361 HYDRATE IV INFUSION ADD-ON: CPT

## 2025-03-06 RX ORDER — METHIMAZOLE 10 MG/1
30 TABLET ORAL DAILY
Qty: 30 TABLET | Refills: 1 | Status: SHIPPED | OUTPATIENT
Start: 2025-03-06

## 2025-03-06 RX ORDER — TRAZODONE HYDROCHLORIDE 50 MG/1
50 TABLET ORAL NIGHTLY PRN
Status: CANCELLED | OUTPATIENT
Start: 2025-03-06

## 2025-03-06 RX ORDER — ONDANSETRON 4 MG/1
4 TABLET, FILM COATED ORAL EVERY 8 HOURS PRN
Status: DISCONTINUED | OUTPATIENT
Start: 2025-03-06 | End: 2025-03-06 | Stop reason: HOSPADM

## 2025-03-06 RX ORDER — METHIMAZOLE 10 MG/1
30 TABLET ORAL DAILY
Status: DISCONTINUED | OUTPATIENT
Start: 2025-03-06 | End: 2025-03-06 | Stop reason: HOSPADM

## 2025-03-06 RX ORDER — ACETAMINOPHEN 160 MG/5ML
650 SOLUTION ORAL EVERY 4 HOURS PRN
Status: DISCONTINUED | OUTPATIENT
Start: 2025-03-06 | End: 2025-03-06 | Stop reason: HOSPADM

## 2025-03-06 RX ORDER — CHLORTHALIDONE 25 MG/1
25 TABLET ORAL DAILY
Status: CANCELLED | OUTPATIENT
Start: 2025-03-06

## 2025-03-06 RX ORDER — DROSPIRENONE AND ETHINYL ESTRADIOL 0.02-3(28)
1 KIT ORAL DAILY
Status: CANCELLED | OUTPATIENT
Start: 2025-03-06

## 2025-03-06 RX ORDER — METOPROLOL SUCCINATE 25 MG/1
12.5 TABLET, EXTENDED RELEASE ORAL DAILY
Status: DISCONTINUED | OUTPATIENT
Start: 2025-03-06 | End: 2025-03-06 | Stop reason: HOSPADM

## 2025-03-06 RX ORDER — PANTOPRAZOLE SODIUM 40 MG/1
40 TABLET, DELAYED RELEASE ORAL
Status: DISCONTINUED | OUTPATIENT
Start: 2025-03-06 | End: 2025-03-06 | Stop reason: HOSPADM

## 2025-03-06 RX ORDER — ACETAMINOPHEN 325 MG/1
650 TABLET ORAL EVERY 4 HOURS PRN
Status: DISCONTINUED | OUTPATIENT
Start: 2025-03-06 | End: 2025-03-06 | Stop reason: HOSPADM

## 2025-03-06 RX ORDER — HYDROXYZINE HYDROCHLORIDE 25 MG/1
25 TABLET, FILM COATED ORAL EVERY 8 HOURS PRN
Status: CANCELLED | OUTPATIENT
Start: 2025-03-06

## 2025-03-06 RX ORDER — ONDANSETRON HYDROCHLORIDE 2 MG/ML
4 INJECTION, SOLUTION INTRAVENOUS EVERY 8 HOURS PRN
Status: DISCONTINUED | OUTPATIENT
Start: 2025-03-06 | End: 2025-03-06 | Stop reason: HOSPADM

## 2025-03-06 RX ORDER — ACETAMINOPHEN 650 MG/1
650 SUPPOSITORY RECTAL EVERY 4 HOURS PRN
Status: DISCONTINUED | OUTPATIENT
Start: 2025-03-06 | End: 2025-03-06 | Stop reason: HOSPADM

## 2025-03-06 RX ORDER — SODIUM CHLORIDE 9 MG/ML
100 INJECTION, SOLUTION INTRAVENOUS CONTINUOUS
Status: ACTIVE | OUTPATIENT
Start: 2025-03-06 | End: 2025-03-06

## 2025-03-06 RX ORDER — CHOLECALCIFEROL (VITAMIN D3) 25 MCG
1000 TABLET ORAL DAILY
Status: CANCELLED | OUTPATIENT
Start: 2025-03-06

## 2025-03-06 RX ORDER — ENOXAPARIN SODIUM 100 MG/ML
40 INJECTION SUBCUTANEOUS EVERY 24 HOURS
Status: DISCONTINUED | OUTPATIENT
Start: 2025-03-06 | End: 2025-03-06 | Stop reason: HOSPADM

## 2025-03-06 RX ORDER — PANTOPRAZOLE SODIUM 40 MG/10ML
40 INJECTION, POWDER, LYOPHILIZED, FOR SOLUTION INTRAVENOUS
Status: DISCONTINUED | OUTPATIENT
Start: 2025-03-06 | End: 2025-03-06 | Stop reason: HOSPADM

## 2025-03-06 RX ORDER — METOPROLOL SUCCINATE 25 MG/1
12.5 TABLET, EXTENDED RELEASE ORAL DAILY
Qty: 30 TABLET | Refills: 1 | Status: SHIPPED | OUTPATIENT
Start: 2025-03-07

## 2025-03-06 RX ADMIN — METOPROLOL SUCCINATE 12.5 MG: 25 TABLET, EXTENDED RELEASE ORAL at 09:58

## 2025-03-06 RX ADMIN — POTASSIUM CHLORIDE 20 MEQ: 1.5 POWDER, FOR SOLUTION ORAL at 09:57

## 2025-03-06 RX ADMIN — SODIUM CHLORIDE 100 ML/HR: 9 INJECTION, SOLUTION INTRAVENOUS at 05:47

## 2025-03-06 RX ADMIN — METHIMAZOLE 30 MG: 10 TABLET ORAL at 14:49

## 2025-03-06 RX ADMIN — PANTOPRAZOLE SODIUM 40 MG: 40 TABLET, DELAYED RELEASE ORAL at 09:58

## 2025-03-06 ASSESSMENT — ACTIVITIES OF DAILY LIVING (ADL): LACK_OF_TRANSPORTATION: NO

## 2025-03-06 ASSESSMENT — PAIN - FUNCTIONAL ASSESSMENT: PAIN_FUNCTIONAL_ASSESSMENT: 0-10

## 2025-03-06 ASSESSMENT — ENCOUNTER SYMPTOMS
SHORTNESS OF BREATH: 1
DIZZINESS: 1
HEADACHES: 1

## 2025-03-06 ASSESSMENT — PAIN SCALES - GENERAL: PAINLEVEL_OUTOF10: 0 - NO PAIN

## 2025-03-06 NOTE — ASSESSMENT & PLAN NOTE
-Possibly secondary to hyperthyroid  -Patient started on low-dose of metoprolol  -Monitor    # Hyperthyroidism  -Endocrinology consultation placed  -Started patient on low-dose of metoprolol    # Headache  -Supportive care, pain management  -CT without acute change    # DVT risk  -Lovenox

## 2025-03-06 NOTE — H&P
History Of Present Illness  Christi Calzada is a 44 y.o. female presenting with headache.  Patient is a 44-year-old female with past medical history of hypertension, syncope who presents for multiple complaints. She states that since Friday she has had a headache all over her head that is different from her usual headaches because it has lasted longer. She states that it does get better after Motrin or Tylenol but then it comes back.  She is still reporting headache.  Mostly she is concerned about shortness of breath and dyspnea on exertion. She denies any chest pain. She has some lightheadedness as well as diarrhea but no nausea or vomiting.  Patient denies any vision changes or neurologic deficits. She denies any fevers or sick contacts. Denies any cough.    ED course: CT head and chest x-ray unremarkable  Labs; TSH is less than 0.01 and thyroxine 5.34.  Noted with tachycardia.    Patient will be admitted under observation status for endocrinology evaluation.  Past Medical History  Past Medical History:   Diagnosis Date    Acute lymphadenitis, unspecified 07/01/2014    Lymphadenitis, acute    Body mass index (BMI) 34.0-34.9, adult 02/07/2022    BMI 34.0-34.9,adult    Body mass index (BMI)30.0-30.9, adult 01/25/2021    Body mass index (BMI) of 30.0 to 30.9 in adult    Candidiasis, unspecified 09/16/2014    Yeast infection    Encounter for screening for lipoid disorders 07/01/2014    Lipid screening    Encounter for screening for malignant neoplasm of vagina     Vaginal Pap smear    Other conditions influencing health status     History of pregnancy    Other conditions influencing health status     Menstruation    Pain in right knee 06/16/2018    Bilateral knee pain    Paresthesia of skin 07/01/2014    Paresthesia of foot    Personal history of other diseases of the nervous system and sense organs 04/22/2013    History of earache    Radiculopathy, cervical region 06/16/2018    Cervical radiculopathy       Surgical  "History  Past Surgical History:   Procedure Laterality Date     SECTION, CLASSIC  2014     Section    HERNIA REPAIR  2014    Hernia Repair    OTHER SURGICAL HISTORY  2020    Hysteroscopy    OTHER SURGICAL HISTORY  2020    Dilation and curettage    OTHER SURGICAL HISTORY  2014    Oophorectomy Unilateral Right Side        Social History  She reports that she has never smoked. She has been exposed to tobacco smoke. She has never used smokeless tobacco. She reports current alcohol use. She reports that she does not use drugs.    Family History  Family History   Problem Relation Name Age of Onset    Migraines Father      Colon cancer Father      Asthma Son      Stroke Paternal Grandfather      Diabetes Other Grandfather         Allergies  Patient has no known allergies.    Review of Systems   Respiratory:  Positive for shortness of breath.    Neurological:  Positive for dizziness and headaches.   All other systems reviewed and are negative.       Physical Exam  Vitals reviewed.   HENT:      Head: Normocephalic.      Nose: Nose normal.      Mouth/Throat:      Mouth: Mucous membranes are moist.   Eyes:      Extraocular Movements: Extraocular movements intact.   Cardiovascular:      Rate and Rhythm: Tachycardia present.   Pulmonary:      Effort: Pulmonary effort is normal.   Abdominal:      General: Bowel sounds are normal.   Musculoskeletal:         General: Normal range of motion.      Cervical back: Neck supple.   Skin:     General: Skin is warm.   Neurological:      Mental Status: She is alert and oriented to person, place, and time.          Last Recorded Vitals  Blood pressure 106/84, pulse (!) 112, temperature 37 °C (98.6 °F), temperature source Oral, resp. rate (!) 25, height 1.549 m (5' 1\"), weight 74.8 kg (165 lb), SpO2 98%.    Relevant Results    ECG 12 lead    Result Date: 3/6/2025  Sinus tachycardia Nonspecific T wave abnormality Abnormal ECG When compared with ECG " of 24-JUL-2020 07:28, Vent. rate has increased BY  51 BPM See ED provider note for full interpretation and clinical correlation Confirmed by Santiago Canales ([id number]) on 3/6/2025 7:59:25 AM    CT head wo IV contrast    Result Date: 3/6/2025  Interpreted By:  Davi Jorge, STUDY: CT HEAD WO IV CONTRAST;  3/6/2025 1:11 am   INDICATION: Signs/Symptoms:HA since friday.   COMPARISON: None.   ACCESSION NUMBER(S): [id number]   ORDERING CLINICIAN: ALEJANDRA WOODSON   TECHNIQUE: Axial noncontrast CT images of the head.   FINDINGS: BRAIN PARENCHYMA: Gray-white matter interfaces are preserved. No mass, mass effect or midline shift.   HEMORRHAGE: No acute intracranial hemorrhage. VENTRICLES and EXTRA-AXIAL SPACES: Normal size. EXTRACRANIAL SOFT TISSUES: Within normal limits. PARANASAL SINUSES/MASTOIDS: Small mucous retention cyst versus polyp in the right maxillary sinus. Remainder of the visualized paranasal sinuses and mastoid air cells are aerated. CALVARIUM: No depressed skull fracture. No destructive osseous lesion.   OTHER FINDINGS: Atherosclerotic calcification of the carotid siphons.       No acute intracranial abnormality.     MACRO: None   Signed by: Davi Jorge 3/6/2025 1:31 AM Dictation workstation:   [id number]    XR chest 2 views    Result Date: 3/5/2025  Interpreted By:  Alesha Merchant, STUDY: XR CHEST 2 VIEWS;  3/5/2025 5:51 pm   INDICATION: Signs/Symptoms:sob.   COMPARISON: 08/24/2023   ACCESSION NUMBER(S): [id number]   ORDERING CLINICIAN: ERASTO CHENEY   FINDINGS: No consolidation. No pleural effusion or pneumothorax. Normal heart size. No acute osseous abnormality.       No acute cardiopulmonary abnormality.   Signed by: Alesha Merchant 3/5/2025 6:05 PM Dictation workstation:   [id number]    BI mammo bilateral screening tomosynthesis    Result Date: 2/28/2025  Interpreted By:  Bjorn Luna, STUDY: BI MAMMO BILATERAL SCREENING TOMOSYNTHESIS;  2/25/2025 5:06 pm   ACCESSION  NUMBER(S): [id number]   ORDERING CLINICIAN: LYNETTE LEAL   INDICATION: Screening.   ,Z12.31 Encounter for screening mammogram for malignant neoplasm of breast   COMPARISON: 04/28/2022   FINDINGS: 2D and tomosynthesis images were reviewed at 1 mm slice thickness.   Density: The breasts are heterogeneously dense, which may obscure small masses.   Best possible images. No suspicious masses or calcifications are identified.       No mammographic evidence of malignancy.   BI-RADS CATEGORY:   BI-RADS Category:  1 Negative. Recommendation:  Annual Screening. Recommended Date:  1 Year. Laterality:  Bilateral.       For any future breast imaging appointments, please call [phone]).   MACRO: None   Signed by: Bjorn Luna 2/28/2025 9:40 AM Dictation workstation:   [id number]      Results for orders placed or performed during the hospital encounter of 03/05/25 (from the past 24 hours)   Sars-CoV-2 and Influenza A/B PCR   Result Value Ref Range    Flu A Result Not Detected Not Detected    Flu B Result Not Detected Not Detected    Coronavirus 2019, PCR Not Detected Not Detected   CBC and Auto Differential   Result Value Ref Range    WBC 5.1 4.4 - 11.3 x10*3/uL    nRBC 0.0 0.0 - 0.0 /100 WBCs    RBC 4.75 4.00 - 5.20 x10*6/uL    Hemoglobin 13.0 12.0 - 16.0 g/dL    Hematocrit 38.3 36.0 - 46.0 %    MCV 81 80 - 100 fL    MCH 27.4 26.0 - 34.0 pg    MCHC 33.9 32.0 - 36.0 g/dL    RDW 11.9 11.5 - 14.5 %    Platelets 319 150 - 450 x10*3/uL    Neutrophils % 51.2 40.0 - 80.0 %    Immature Granulocytes %, Automated 0.2 0.0 - 0.9 %    Lymphocytes % 37.7 13.0 - 44.0 %    Monocytes % 9.9 2.0 - 10.0 %    Eosinophils % 0.6 0.0 - 6.0 %    Basophils % 0.4 0.0 - 2.0 %    Neutrophils Absolute 2.60 1.20 - 7.70 x10*3/uL    Immature Granulocytes Absolute, Automated 0.01 0.00 - 0.70 x10*3/uL    Lymphocytes Absolute 1.91 1.20 - 4.80 x10*3/uL    Monocytes Absolute 0.50 0.10 - 1.00 x10*3/uL    Eosinophils Absolute 0.03 0.00 - 0.70 x10*3/uL     Basophils Absolute 0.02 0.00 - 0.10 x10*3/uL   Comprehensive metabolic panel   Result Value Ref Range    Glucose 93 74 - 99 mg/dL    Sodium 139 136 - 145 mmol/L    Potassium 3.4 (L) 3.5 - 5.3 mmol/L    Chloride 100 98 - 107 mmol/L    Bicarbonate 28 21 - 32 mmol/L    Anion Gap 14 10 - 20 mmol/L    Urea Nitrogen 15 6 - 23 mg/dL    Creatinine 0.43 (L) 0.50 - 1.05 mg/dL    eGFR >90 >60 mL/min/1.73m*2    Calcium 10.5 (H) 8.6 - 10.3 mg/dL    Albumin 4.0 3.4 - 5.0 g/dL    Alkaline Phosphatase 60 33 - 110 U/L    Total Protein 7.8 6.4 - 8.2 g/dL    AST 29 9 - 39 U/L    Bilirubin, Total 0.6 0.0 - 1.2 mg/dL    ALT 36 7 - 45 U/L   Magnesium   Result Value Ref Range    Magnesium 1.40 (L) 1.60 - 2.40 mg/dL   Troponin I, High Sensitivity, Initial   Result Value Ref Range    Troponin I, High Sensitivity 3 0 - 13 ng/L   RSV PCR   Result Value Ref Range    RSV PCR Not Detected Not Detected   TSH with reflex to Free T4 if abnormal   Result Value Ref Range    Thyroid Stimulating Hormone <0.01 (L) 0.44 - 3.98 mIU/L   Thyroxine, Free   Result Value Ref Range    Thyroxine, Free 5.34 (H) 0.61 - 1.12 ng/dL   ECG 12 lead   Result Value Ref Range    Ventricular Rate 112 BPM    Atrial Rate 112 BPM    NE Interval 138 ms    QRS Duration 76 ms    QT Interval 348 ms    QTC Calculation(Bazett) 475 ms    P Axis 62 degrees    R Axis 72 degrees    T Axis 20 degrees    QRS Count 18 beats    Q Onset 219 ms    P Onset 150 ms    P Offset 198 ms    T Offset 393 ms    QTC Fredericia 428 ms   hCG, Urine, Qualitative   Result Value Ref Range    HCG, Urine NEGATIVE NEGATIVE   Urinalysis with Reflex Culture and Microscopic   Result Value Ref Range    Color, Urine Yellow Light-Yellow, Yellow, Dark-Yellow    Appearance, Urine Clear Clear    Specific Gravity, Urine 1.031 1.005 - 1.035    pH, Urine 6.0 5.0, 5.5, 6.0, 6.5, 7.0, 7.5, 8.0    Protein, Urine 10 (TRACE) NEGATIVE, 10 (TRACE), 20 (TRACE) mg/dL    Glucose, Urine Normal Normal mg/dL    Blood, Urine  NEGATIVE NEGATIVE mg/dL    Ketones, Urine NEGATIVE NEGATIVE mg/dL    Bilirubin, Urine NEGATIVE NEGATIVE mg/dL    Urobilinogen, Urine Normal Normal mg/dL    Nitrite, Urine NEGATIVE NEGATIVE    Leukocyte Esterase, Urine NEGATIVE NEGATIVE   Urinalysis Microscopic   Result Value Ref Range    WBC, Urine 1-5 1-5, NONE /HPF    RBC, Urine NONE NONE, 1-2, 3-5 /HPF    Squamous Epithelial Cells, Urine 1-9 (SPARSE) Reference range not established. /HPF    Mucus, Urine 1+ Reference range not established. /LPF   Troponin, High Sensitivity, 1 Hour   Result Value Ref Range    Troponin I, High Sensitivity 4 0 - 13 ng/L   D-Dimer, VTE Exclusion   Result Value Ref Range    D-Dimer, Quantitative VTE Exclusion 298 <=500 ng/mL FEU   CBC   Result Value Ref Range    WBC 4.1 (L) 4.4 - 11.3 x10*3/uL    nRBC 0.0 0.0 - 0.0 /100 WBCs    RBC 4.05 4.00 - 5.20 x10*6/uL    Hemoglobin 11.2 (L) 12.0 - 16.0 g/dL    Hematocrit 32.8 (L) 36.0 - 46.0 %    MCV 81 80 - 100 fL    MCH 27.7 26.0 - 34.0 pg    MCHC 34.1 32.0 - 36.0 g/dL    RDW 12.0 11.5 - 14.5 %    Platelets 238 150 - 450 x10*3/uL   Basic metabolic panel   Result Value Ref Range    Glucose 90 74 - 99 mg/dL    Sodium 141 136 - 145 mmol/L    Potassium 3.9 3.5 - 5.3 mmol/L    Chloride 108 (H) 98 - 107 mmol/L    Bicarbonate 25 21 - 32 mmol/L    Anion Gap 12 10 - 20 mmol/L    Urea Nitrogen 15 6 - 23 mg/dL    Creatinine 0.36 (L) 0.50 - 1.05 mg/dL    eGFR >90 >60 mL/min/1.73m*2    Calcium 9.3 8.6 - 10.3 mg/dL   Magnesium   Result Value Ref Range    Magnesium 1.64 1.60 - 2.40 mg/dL    Scheduled medications  enoxaparin, 40 mg, subcutaneous, q24h  metoprolol succinate XL, 12.5 mg, oral, Daily  pantoprazole, 40 mg, oral, Daily before breakfast   Or  pantoprazole, 40 mg, intravenous, Daily before breakfast  potassium chloride, 20 mEq, oral, BID      Continuous medications  sodium chloride 0.9%, 100 mL/hr, Last Rate: 100 mL/hr (03/06/25 0547)      PRN medications  PRN medications: acetaminophen **OR**  acetaminophen **OR** acetaminophen, ondansetron **OR** ondansetron      Assessment/Plan   Assessment & Plan  Tachycardia  -Possibly secondary to hyperthyroid  -Patient started on low-dose of metoprolol  -Monitor    # Hyperthyroidism  -Endocrinology consultation placed  -Started patient on low-dose of metoprolol    # Headache  -Supportive care, pain management  -CT without acute change    # DVT risk  -Lovenox    Discharge plan  Anticipated discharge home today after endocrinology consultation    Case discussed with attending provider       I spent 55 minutes in the professional and overall care of this patient.      Celina Lai, APRN-CNP

## 2025-03-06 NOTE — PROGRESS NOTES
Pharmacy Medication History     Source of Information: patient    Additional concerns with the patient's PTA list.   N/a  The following updates were made to the Prior to Admission medication list:     Medications ADDED:   N/a  Medications CHANGED:  N/a  Medications REMOVED:   N/a  Medications NOT TAKING:   Vitamin d3  Naproxen  navarro    Allergy reviewed : Yes    Meds 2 Beds : yes    Outpatient pharmacy confirmed and updated in chart : Yes    Pharmacy name:  [email or web address]    The list below reflectives the updated PTA list. Please review each medication in order reconciliation for additional clarification and justification.    Prior to Admission Medications   Prescriptions Last Dose Informant   chlorthalidone (Hygroton) 25 mg tablet Unknown Self   Sig: Take 1 tablet (25 mg) by mouth once daily.   hydrOXYzine HCL (Atarax) 25 mg tablet Unknown Self   Sig: Take 1 tablet (25 mg) by mouth every 8 hours if needed for itching, allergies or anxiety.   traZODone (Desyrel) 50 mg tablet Unknown Self   Sig: Take 1 tablet (50 mg) by mouth as needed at bedtime for sleep.      Facility-Administered Medications: None       The list below reflectives the updated allergy list. Please review each documented allergy for additional clarification and justification.    No Known Allergies       03/06/25 at 9:14 AM - Perri Drake

## 2025-03-06 NOTE — DISCHARGE SUMMARY
Discharge Diagnosis  Tachycardia, hyperthyroidism    Issues Requiring Follow-Up  Please follow-up with PCP endocrinology  New medications were prescribed and sent to [facility] pharmacy  New medications: Metoprolol and methimazole    Discharge Meds     Medication List      START taking these medications     methIMAzole 10 mg tablet; Commonly known as: Tapazole; Take 3 tablets   (30 mg) by mouth once daily.   metoprolol succinate XL 25 mg 24 hr tablet; Commonly known as:   Toprol-XL; Take 0.5 tablets (12.5 mg) by mouth once daily. Do not crush or   chew.; Start taking on: March 7, 2025     CONTINUE taking these medications     chlorthalidone 25 mg tablet; Commonly known as: Hygroton; Take 1 tablet   (25 mg) by mouth once daily.   hydrOXYzine HCL 25 mg tablet; Commonly known as: Atarax; Take 1 tablet   (25 mg) by mouth every 8 hours if needed for itching, allergies or   anxiety.   traZODone 50 mg tablet; Commonly known as: Desyrel; Take 1 tablet (50   mg) by mouth as needed at bedtime for sleep.     STOP taking these medications     cholecalciferol 25 MCG (1000 UT) capsule; Commonly known as: Vitamin D3   drospirenone-ethinyl estradiol 3-0.02 mg tablet; Commonly known as: Darshana   naproxen 500 mg tablet; Commonly known as: Naprosyn       Test Results Pending At Discharge  Pending Labs       Order Current Status    Thyrotropin receptor antibody In process            Hospital Course   Patient is a 44-year-old female with past medical history of hypertension, syncope who presents for multiple complaints. She states that since Friday she has had a headache all over her head that is different from her usual headaches because it has lasted longer. She states that it does get better after Motrin or Tylenol but then it comes back.  She is still reporting headache.  Mostly she is concerned about shortness of breath and dyspnea on exertion. She denies any chest pain. She has some lightheadedness as well as diarrhea but no  nausea or vomiting.  Patient denies any vision changes or neurologic deficits. She denies any fevers or sick contacts. Denies any cough.    ED course: CT head and chest x-ray unremarkable  Labs; TSH is less than 0.01 and thyroxine 5.34.  Noted with tachycardia.     Patient will be admitted under observation status for endocrinology evaluation.    Case discussed with endocrinology and recommending beta-blocker and methimazole with close outpatient follow-up.  Follow-up appointment with endocrinology made for April 11  Patient reports feeling better  Case discussed with attending provider  I spent over 35 minutes in professional and over care of this patient.    Pertinent Physical Exam At Time of Discharge  Physical Exam  Vitals reviewed.   HENT:      Head: Normocephalic and atraumatic.      Nose: Nose normal.      Mouth/Throat:      Mouth: Mucous membranes are moist.   Eyes:      Extraocular Movements: Extraocular movements intact.   Cardiovascular:      Rate and Rhythm: Tachycardia present.   Pulmonary:      Effort: Pulmonary effort is normal.   Abdominal:      General: Bowel sounds are normal.   Musculoskeletal:         General: Normal range of motion.      Cervical back: Neck supple.   Skin:     General: Skin is warm.   Neurological:      Mental Status: She is alert and oriented to person, place, and time.         Outpatient Follow-Up  Future Appointments   Date Time Provider Department Center   3/18/2025  3:15 PM Jean Clemente MD [id number] [facility]   4/11/2025 11:40 AM Dawson Mccain MD [id number] [facility]   6/6/2025  1:40 PM Corinna Begum MD [id number] [facility]   7/2/2025  3:00 PM Chad Pinedo MD PhD [id number] [facility]         Celina Lai, APRN-CNP

## 2025-03-06 NOTE — DISCHARGE INSTRUCTIONS
Please follow-up with PCP endocrinology  New medications were prescribed and sent to [facility] retail pharmacy  New medications: Metoprolol and methimazole

## 2025-03-06 NOTE — PROGRESS NOTES
Transitional Care Coordination Progress Note:  Plan per Medical/Surgical team: treatment of hyperthyroidism with endocrine consult   Status: Observation  Payor source:  employee (central scheduling)  Discharge disposition: Home with mother, step father & 18 year old daughter  Potential Barriers: tachycardia & malaise   ADOD: 3/6/2025   EDOUARD Palm RN, BSN Transitional Care Coordinator ED# [phone]      03/06/25 0912   Discharge Planning   Living Arrangements Children;Parent;Family members   Support Systems Parent   Assistance Needed Endocrine consult   Type of Residence Private residence   Number of Stairs to Enter Residence 3   Number of Stairs Within Residence 15   Home or Post Acute Services None   Expected Discharge Disposition Home   Does the patient need discharge transport arranged? No   Financial Resource Strain   How hard is it for you to pay for the very basics like food, housing, medical care, and heating? Not hard   Housing Stability   In the last 12 months, was there a time when you were not able to pay the mortgage or rent on time? N   In the past 12 months, how many times have you moved where you were living? 1   At any time in the past 12 months, were you homeless or living in a shelter (including now)? N   Transportation Needs   In the past 12 months, has lack of transportation kept you from medical appointments or from getting medications? no   In the past 12 months, has lack of transportation kept you from meetings, work, or from getting things needed for daily living? No   Stroke Family Assessment   Stroke Family Assessment Needed No   Intensity of Service   Intensity of Service 0-30 min

## 2025-03-06 NOTE — ED PROVIDER NOTES
HPI   Chief Complaint   Patient presents with    Flu Symptoms       HPI  Patient is a 44-year-old female with past medical history significant for hypertension, syncope who presents for multiple complaints.  She states that since Friday she has had a headache all over her head that is different from her usual headaches because it has lasted longer.  She states that it does get better after Motrin or Tylenol but then it comes back.  Mostly she is concerned about some shortness of breath and dyspnea on exertion.  She denies any chest pain.  She has some lightheadedness as well as diarrhea but no nausea, vomiting, Binu pain, room spinning sensation.  She has no visual field cuts, diplopia, meningismus or neurologic deficits.  She has chronic lower extremity swelling issues with her feet because she works from home and her feet dangle and that is unchanged today.  She denies any fevers or sick contacts.  Denies any cough.      PMHx: As above  PSHx: Denies pertinent  FamilyHx: Denies pertinent  SocialHx: Denies  Allergies: NKDA  Medications: See Medication Reconciliation     ROS  As above otherwise denies    Physical Exam    GENERAL: Awake and Alert, No Acute Distress  HEENT: AT/NC, PERRL, EOMI, Normal Oropharynx, No Signs of Dehydration  NECK: Normal Inspection, No JVD  CARDIOVASCULAR: TRR, No M/R/G  RESPIRATORY: CTA Bilaterally, No Wheezes, Rales or Rhonchi, Chest Wall Non-tender  ABDOMEN: Soft, non-tender abdomen, Normal Bowel Sounds, No Distention  BACK: No CVA Tenderness  SKIN: Normal Color, Warm, Dry, No Rashes   EXTREMITIES: Non-Tender, Full ROM, No Pedal Edema  NEURO: A&O x 3, Normal Motor and Sensation, Normal Mood and Affect    Nursing Assessment and Vitals Reviewed    EKG showed sinus tachycardia to 112 bpm.  There are no significant interval prolongations or ischemic ST changes.  There are T wave inversions to 3, V6 surgery such as 5 and possibly 6.  No axis deviation.    Medical Decision  Patient is a  44-year-old female with past medical history significant for hypertension, syncope who presents for multiple complaints.  She states that since Friday she has had a headache all over her head that is different from her usual headaches because it has lasted longer.  She states that it does get better after Motrin or Tylenol but then it comes back.  Mostly she is concerned about some shortness of breath and dyspnea on exertion.  She denies any chest pain.  She has some lightheadedness as well as diarrhea but no nausea, vomiting, Binu pain, room spinning sensation.  She has no visual field cuts, diplopia, meningismus or neurologic deficits.  She has chronic lower extremity swelling issues with her feet because she works from home and her feet dangle and that is unchanged today.  She denies any fevers or sick contacts.  Denies any cough.    On evaluation she is well-appearing and in no acute distress.  Lungs are clear and heart is tachycardic but regular.  Abdomen is soft nontender nondistended.  She is neurologically intact without meningismus.  She is not hypoxic, tachypneic but is tachycardic.  Will start IV fluids and give medications for headache although low suspicion for acute intercranial pathology at this time.    Lab work showed hypokalemia and hypomagnesemia both of which were repleted.  She has 2 negative troponins.  TSH is decreased pending T4.  D-dimer is negative.  She has no leukocytosis or anemia.  She is negative for COVID and influenza.  She is negative for pregnancy and urinalysis is unremarkable.  Chest x-ray showed no acute cardiopulmonary pathology and CT head is unremarkable.  On reevaluation her symptoms are greatly improved but she is still persistently tachycardic and denies any history of tachycardia.  She is ordered additional fluids.  She is admitted for observation.              Patient History   Past Medical History:   Diagnosis Date    Acute lymphadenitis, unspecified 07/01/2014     Lymphadenitis, acute    Body mass index (BMI) 34.0-34.9, adult 2022    BMI 34.0-34.9,adult    Body mass index (BMI)30.0-30.9, adult 2021    Body mass index (BMI) of 30.0 to 30.9 in adult    Candidiasis, unspecified 2014    Yeast infection    Encounter for screening for lipoid disorders 2014    Lipid screening    Encounter for screening for malignant neoplasm of vagina     Vaginal Pap smear    Other conditions influencing health status     History of pregnancy    Other conditions influencing health status     Menstruation    Pain in right knee 2018    Bilateral knee pain    Paresthesia of skin 2014    Paresthesia of foot    Personal history of other diseases of the nervous system and sense organs 2013    History of earache    Radiculopathy, cervical region 2018    Cervical radiculopathy     Past Surgical History:   Procedure Laterality Date     SECTION, CLASSIC  2014     Section    HERNIA REPAIR  2014    Hernia Repair    OTHER SURGICAL HISTORY  2020    Hysteroscopy    OTHER SURGICAL HISTORY  2020    Dilation and curettage    OTHER SURGICAL HISTORY  2014    Oophorectomy Unilateral Right Side     Family History   Problem Relation Name Age of Onset    Migraines Father      Colon cancer Father      Asthma Son      Stroke Paternal Grandfather      Diabetes Other Grandfather      Social History     Tobacco Use    Smoking status: Never     Passive exposure: Past    Smokeless tobacco: Never   Vaping Use    Vaping status: Never Used   Substance Use Topics    Alcohol use: Yes     Comment: Occasionally    Drug use: Never       Physical Exam   ED Triage Vitals [25 1659]   Temperature Heart Rate Respirations BP   37 °C (98.6 °F) (!) 132 18 133/83      Pulse Ox Temp Source Heart Rate Source Patient Position   97 % Oral Monitor Sitting      BP Location FiO2 (%)     Left arm --       Physical Exam      ED Course & MDM   Diagnoses as  of 03/06/25 0234   Tachycardia                 No data recorded     Abhishek Coma Scale Score: 15 (03/05/25 1925 : Opal Delgado RN)                           Medical Decision Making      Procedure  Procedures     Brittni Siddiqui MD  03/06/25 0234

## 2025-03-07 ENCOUNTER — APPOINTMENT (OUTPATIENT)
Dept: OBSTETRICS AND GYNECOLOGY | Facility: CLINIC | Age: 45
End: 2025-03-07
Payer: COMMERCIAL

## 2025-03-07 ENCOUNTER — PATIENT OUTREACH (OUTPATIENT)
Dept: CARE COORDINATION | Facility: CLINIC | Age: 45
End: 2025-03-07

## 2025-03-07 NOTE — PROGRESS NOTES
Outreach call to patient to support a smooth transition of care from recent admission.  Left voicemail message for patient with my contact information.    Lina Jones RN [facility]  [phone]

## 2025-03-07 NOTE — PROGRESS NOTES
Wrap Up  Wrap Up Additional Comments: Crissy returned call, is back to baseline and feels much better.  Only question is about blood work, thinks they told her to get labs checked every 2 weeks but orders are not in the chart.  She has orders from her PCP from her last visit and will do a walk-in with the lab using those orders.  Otherwise, she has a PCP appt and an endocrinology appt scheduled (3/7/2025 12:28 PM)    Appointments  Does the patient have a primary care provider?: Yes (3/7/2025 12:28 PM)  Care Management Interventions: Verified appointment date/time/provider (Has endocrine appt 4/11) (3/7/2025 12:28 PM)  Has the patient kept scheduled appointments due by today?: Not applicable (3/7/2025 12:28 PM)    Patient Teaching  Does the patient have access to their discharge instructions?: Yes (3/7/2025 12:28 PM)  Care Management Interventions: Reviewed instructions with patient (3/7/2025 12:28 PM)      Lina Jones RN [facility]  [phone]

## 2025-03-08 LAB — TSH RECEP AB SER-ACNC: 16.6 IU/L

## 2025-03-08 PROCEDURE — RXMED WILLOW AMBULATORY MEDICATION CHARGE

## 2025-03-12 ENCOUNTER — TELEMEDICINE (OUTPATIENT)
Dept: PRIMARY CARE | Facility: CLINIC | Age: 45
End: 2025-03-12
Payer: COMMERCIAL

## 2025-03-12 DIAGNOSIS — E05.90 HYPERTHYROIDISM: Primary | ICD-10-CM

## 2025-03-12 PROCEDURE — 99214 OFFICE O/P EST MOD 30 MIN: CPT | Performed by: INTERNAL MEDICINE

## 2025-03-12 ASSESSMENT — ENCOUNTER SYMPTOMS
WHEEZING: 0
NAUSEA: 0
CONFUSION: 0
VOMITING: 0
FEVER: 0
MYALGIAS: 0
DIFFICULTY URINATING: 0
ABDOMINAL PAIN: 0
SHORTNESS OF BREATH: 0
DIZZINESS: 0
FLANK PAIN: 0
DIARRHEA: 0
CHILLS: 0
NUMBNESS: 0
EYE DISCHARGE: 0
WOUND: 0
DYSURIA: 0
COUGH: 0

## 2025-03-12 NOTE — PROGRESS NOTES
Subjective   Subjective:     History Of Present Illness:  Christi Calzada is a 44 y.o. female with a PMH of HTN, syncope, and newly diagnosed hyperthyroidism, who presents to [facility] appointment for post-hospitalization visit.  She admitted to the hospital on 3/5 with a c/o headaches, mild SOB, and dyspnea on exertion.  In the ED, she was found to be tachycardic at  but normotensive and afebrile.  TSH was decreased and free T4 was elevated.  She was admitted for hyperthyroidism and started Metoprolol and Methimazole and was instructed to follow up with endocrinology in 1 months with repeat blood work in 2 weeks post-hospitalization.  Today, pt is doing well and does not have any acute symptoms.  She has mild rash on her side/flank that is attributed to dry skin and does not have any redness and not itchy.  No other medical complains.  She requested to have FMLA papers filled out.      Past Medical History:  She has a past medical history of Acute lymphadenitis, unspecified (2014), Body mass index (BMI) 34.0-34.9, adult (2022), Body mass index (BMI)30.0-30.9, adult (2021), Candidiasis, unspecified (2014), Encounter for screening for lipoid disorders (2014), Encounter for screening for malignant neoplasm of vagina, Other conditions influencing health status, Other conditions influencing health status, Pain in right knee (2018), Paresthesia of skin (2014), Personal history of other diseases of the nervous system and sense organs (2013), and Radiculopathy, cervical region (2018).    She has no past medical history of Personal history of irradiation.    Past Surgical History:  She has a past surgical history that includes Other surgical history (2020); Other surgical history (2020);  section, classic (2014); Other surgical history (2014); and Hernia repair (2014).     Social History:  She reports that she has never  smoked. She has been exposed to tobacco smoke. She has never used smokeless tobacco. She reports current alcohol use. She reports that she does not use drugs.    Family History:  Family History   Problem Relation Name Age of Onset    Migraines Father      Colon cancer Father      Asthma Son      Stroke Paternal Grandfather      Diabetes Other Grandfather      Allergies:  Patient has no known allergies.    Home Medications:  (Not in a hospital admission)    Review Of Systems:  11-point ROS was performed and is negative except as noted below and in the HPI.     Review of Systems   Constitutional:  Negative for chills and fever.   Eyes:  Negative for discharge.   Respiratory:  Negative for cough, shortness of breath and wheezing.    Cardiovascular:  Negative for chest pain and leg swelling.   Gastrointestinal:  Negative for abdominal pain, diarrhea, nausea and vomiting.   Genitourinary:  Negative for difficulty urinating, dysuria and flank pain.   Musculoskeletal:  Negative for myalgias.   Skin:  Negative for wound.   Neurological:  Negative for dizziness and numbness.   Psychiatric/Behavioral:  Negative for confusion.         Objective   Objective:     There were no vitals taken for this visit.    Physical Exam  Constitutional:       General: She is not in acute distress.     Appearance: Normal appearance.   HENT:      Head: Normocephalic and atraumatic.      Nose: Nose normal.   Eyes:      Conjunctiva/sclera: Conjunctivae normal.   Pulmonary:      Effort: No respiratory distress.   Skin:     Coloration: Skin is not jaundiced.   Neurological:      General: No focal deficit present.      Mental Status: She is alert. Mental status is at baseline.          Assessment & Plan:     Assessment/Plan     Problem List Items Addressed This Visit       Hyperthyroidism - Primary     -Started Metoprolol and Methimazole  -Scheduled to see endocrinology in April 12th  -Will need to submit thyroid levels on March 20th  -Will submit  FMLA papers         Relevant Orders    TSH    T4, free    T3, free     #Health Maintenance:  - TSH, free T3/T4 ordered in 2 weeks    Revisit Topics: hyperthyroidism and rash    Dispo: Patient is scheduled to return to clinic as scheduled.    Neftali Lua, PGY-3  Internal Medicine     Disclaimer: Documentation completed with the information available at the time of input. The times in the chart may not be reflective of actual patient care times, interventions, or procedures. Documentation occurs after the physical care of the patient.

## 2025-03-12 NOTE — ASSESSMENT & PLAN NOTE
-Started Metoprolol and Methimazole  -Scheduled to see endocrinology in April 12th  -Will need to submit thyroid levels on March 20th and was recommended to avoid taking supplements rich in vitamin B7 biotin  -Will submit [facility] papers

## 2025-03-12 NOTE — PROGRESS NOTES
I reviewed the resident/fellow's documentation and discussed the patient with the resident/fellow. I agree with the resident/fellow's medical decision making as documented in the note.  As the attending physician, I certify that I personally reviewed the patient's history and personally examined the patient to confirm the physical findings described above, and that I reviewed the relevant imaging studies and available reports. I also discussed the differential diagnosis and all of the proposed management plans with the patient and individuals accompanying the patient to this visit. They had the opportunity to ask questions about the proposed management plans and to have those questions answered.   An interactive audio and video telecommunication system which permits real time communications between the patient (at the originating site) and provider (at the distant site) was utilized to provide this telehealth service.    Verbal consent was requested and obtained from the patient on the day of encounter.  This is a virtual visit using HIPAA compliant video platform. It required patient-provider interaction for the medical decision making as documented.       Corinna Begum MD

## 2025-03-13 ENCOUNTER — PHARMACY VISIT (OUTPATIENT)
Dept: PHARMACY | Facility: CLINIC | Age: 45
End: 2025-03-13
Payer: COMMERCIAL

## 2025-03-13 PROCEDURE — RXMED WILLOW AMBULATORY MEDICATION CHARGE

## 2025-03-15 ENCOUNTER — HOSPITAL ENCOUNTER (EMERGENCY)
Facility: HOSPITAL | Age: 45
Discharge: HOME | End: 2025-03-15
Payer: COMMERCIAL

## 2025-03-15 VITALS
DIASTOLIC BLOOD PRESSURE: 64 MMHG | TEMPERATURE: 97.4 F | HEIGHT: 61 IN | WEIGHT: 165 LBS | OXYGEN SATURATION: 97 % | HEART RATE: 110 BPM | SYSTOLIC BLOOD PRESSURE: 125 MMHG | BODY MASS INDEX: 31.15 KG/M2 | RESPIRATION RATE: 16 BRPM

## 2025-03-15 PROCEDURE — 4500999001 HC ED NO CHARGE

## 2025-03-15 PROCEDURE — 99281 EMR DPT VST MAYX REQ PHY/QHP: CPT

## 2025-03-15 ASSESSMENT — PAIN - FUNCTIONAL ASSESSMENT: PAIN_FUNCTIONAL_ASSESSMENT: 0-10

## 2025-03-15 ASSESSMENT — COLUMBIA-SUICIDE SEVERITY RATING SCALE - C-SSRS
2. HAVE YOU ACTUALLY HAD ANY THOUGHTS OF KILLING YOURSELF?: NO
6. HAVE YOU EVER DONE ANYTHING, STARTED TO DO ANYTHING, OR PREPARED TO DO ANYTHING TO END YOUR LIFE?: NO
1. IN THE PAST MONTH, HAVE YOU WISHED YOU WERE DEAD OR WISHED YOU COULD GO TO SLEEP AND NOT WAKE UP?: NO

## 2025-03-15 ASSESSMENT — PAIN DESCRIPTION - LOCATION: LOCATION: ABDOMEN

## 2025-03-15 ASSESSMENT — PAIN SCALES - GENERAL
PAINLEVEL_OUTOF10: 8
PAINLEVEL_OUTOF10: 8

## 2025-03-15 ASSESSMENT — PAIN DESCRIPTION - PROGRESSION: CLINICAL_PROGRESSION: NOT CHANGED

## 2025-03-15 ASSESSMENT — PAIN DESCRIPTION - PAIN TYPE: TYPE: ACUTE PAIN

## 2025-03-15 NOTE — ED TRIAGE NOTES
Pt from home c/o lower abdominal pain. Pt states that this woke her up out of sleep. Pt denies any N/ V/D. Pt states that she thinks it may be a kidney stone or from the dairy she ate earlier.

## 2025-03-18 ENCOUNTER — APPOINTMENT (OUTPATIENT)
Dept: OBSTETRICS AND GYNECOLOGY | Facility: CLINIC | Age: 45
End: 2025-03-18
Payer: COMMERCIAL

## 2025-03-20 DIAGNOSIS — E05.90 HYPERTHYROIDISM: ICD-10-CM

## 2025-03-21 ENCOUNTER — PATIENT OUTREACH (OUTPATIENT)
Dept: CARE COORDINATION | Facility: CLINIC | Age: 45
End: 2025-03-21

## 2025-03-21 ENCOUNTER — APPOINTMENT (OUTPATIENT)
Dept: PRIMARY CARE | Facility: CLINIC | Age: 45
End: 2025-03-21
Payer: COMMERCIAL

## 2025-03-21 DIAGNOSIS — E05.90 HYPERTHYROIDISM: ICD-10-CM

## 2025-03-21 RX ORDER — METHIMAZOLE 10 MG/1
30 TABLET ORAL DAILY
Qty: 90 TABLET | Refills: 0 | Status: SHIPPED | OUTPATIENT
Start: 2025-03-21

## 2025-03-21 NOTE — PROGRESS NOTES
[facility] [place] [facility] follow up:  Call placed and VMM left    Lina Jones, RN [facility]  [phone]

## 2025-03-23 PROCEDURE — RXMED WILLOW AMBULATORY MEDICATION CHARGE

## 2025-03-24 ENCOUNTER — TELEPHONE (OUTPATIENT)
Dept: PRIMARY CARE | Facility: CLINIC | Age: 45
End: 2025-03-24
Payer: COMMERCIAL

## 2025-03-24 DIAGNOSIS — E05.90 HYPERTHYROIDISM: ICD-10-CM

## 2025-03-24 NOTE — TELEPHONE ENCOUNTER
PATIENT IS CALLING WANTS TO KNOW IF YOU CAN PLACE A REFERRL FOR HER TO SEE ENDO AT [facility].      SHE IS WANTING A SOONER APPT THEN WHAT [facility] HAS AVAILABLE.

## 2025-03-26 ENCOUNTER — TELEPHONE (OUTPATIENT)
Dept: PRIMARY CARE | Facility: CLINIC | Age: 45
End: 2025-03-26
Payer: COMMERCIAL

## 2025-03-26 DIAGNOSIS — E87.6 HYPOKALEMIA: Primary | ICD-10-CM

## 2025-03-26 LAB
T3FREE SERPL-MCNC: 9.9 PG/ML (ref 2.3–4.2)
T4 FREE SERPL-MCNC: 3.4 NG/DL (ref 0.8–1.8)
TSH SERPL-ACNC: <0.01 MIU/L

## 2025-03-26 PROCEDURE — RXMED WILLOW AMBULATORY MEDICATION CHARGE

## 2025-03-26 RX ORDER — POTASSIUM CHLORIDE 20 MEQ/1
60 TABLET, EXTENDED RELEASE ORAL DAILY
Qty: 21 TABLET | Refills: 0 | Status: SHIPPED | OUTPATIENT
Start: 2025-03-26 | End: 2025-04-03

## 2025-03-26 NOTE — PROGRESS NOTES
Patient with K of 2.7   Will send Kcl 60mEq once daily x7 days   Repeat BMP in 1 week   If symptomatic patient should go to ED

## 2025-03-27 ENCOUNTER — PHARMACY VISIT (OUTPATIENT)
Dept: PHARMACY | Facility: CLINIC | Age: 45
End: 2025-03-27
Payer: COMMERCIAL

## 2025-03-27 ENCOUNTER — TELEPHONE (OUTPATIENT)
Dept: ENDOCRINOLOGY | Facility: CLINIC | Age: 45
End: 2025-03-27
Payer: COMMERCIAL

## 2025-03-27 LAB
25(OH)D3+25(OH)D2 SERPL-MCNC: 19 NG/ML (ref 30–100)
ALBUMIN SERPL-MCNC: 3.9 G/DL (ref 3.6–5.1)
ALBUMIN/CREAT UR: 14 MG/G CREAT
ALP SERPL-CCNC: 58 U/L (ref 31–125)
ALT SERPL-CCNC: 21 U/L (ref 6–29)
ANION GAP SERPL CALCULATED.4IONS-SCNC: 10 MMOL/L (CALC) (ref 7–17)
AST SERPL-CCNC: 19 U/L (ref 10–30)
BILIRUB SERPL-MCNC: 0.6 MG/DL (ref 0.2–1.2)
BUN SERPL-MCNC: 12 MG/DL (ref 7–25)
CALCIUM SERPL-MCNC: 10 MG/DL (ref 8.6–10.2)
CHLORIDE SERPL-SCNC: 100 MMOL/L (ref 98–110)
CHOLEST SERPL-MCNC: 141 MG/DL
CHOLEST/HDLC SERPL: 3.4 (CALC)
CO2 SERPL-SCNC: 29 MMOL/L (ref 20–32)
CREAT SERPL-MCNC: 0.45 MG/DL (ref 0.5–0.99)
CREAT UR-MCNC: 224 MG/DL (ref 20–275)
EGFRCR SERPLBLD CKD-EPI 2021: 122 ML/MIN/1.73M2
ERYTHROCYTE [DISTWIDTH] IN BLOOD BY AUTOMATED COUNT: 11.7 % (ref 11–15)
EST. AVERAGE GLUCOSE BLD GHB EST-MCNC: 120 MG/DL
EST. AVERAGE GLUCOSE BLD GHB EST-SCNC: 6.6 MMOL/L
GLUCOSE SERPL-MCNC: 121 MG/DL (ref 65–99)
HBA1C MFR BLD: 5.8 % OF TOTAL HGB
HCT VFR BLD AUTO: 38.1 % (ref 35–45)
HDLC SERPL-MCNC: 41 MG/DL
HGB BLD-MCNC: 12.6 G/DL (ref 11.7–15.5)
LDLC SERPL CALC-MCNC: 82 MG/DL (CALC)
MCH RBC QN AUTO: 27.1 PG (ref 27–33)
MCHC RBC AUTO-ENTMCNC: 33.1 G/DL (ref 32–36)
MCV RBC AUTO: 81.9 FL (ref 80–100)
MICROALBUMIN UR-MCNC: 3.1 MG/DL
NONHDLC SERPL-MCNC: 100 MG/DL (CALC)
PLATELET # BLD AUTO: 315 THOUSAND/UL (ref 140–400)
PMV BLD REES-ECKER: 10.2 FL (ref 7.5–12.5)
POTASSIUM SERPL-SCNC: 2.7 MMOL/L (ref 3.5–5.3)
PROT SERPL-MCNC: 6.9 G/DL (ref 6.1–8.1)
RBC # BLD AUTO: 4.65 MILLION/UL (ref 3.8–5.1)
SODIUM SERPL-SCNC: 139 MMOL/L (ref 135–146)
T4 FREE SERPL-MCNC: 3.4 NG/DL (ref 0.8–1.8)
TRIGL SERPL-MCNC: 90 MG/DL
TSH SERPL-ACNC: <0.01 MIU/L
WBC # BLD AUTO: 5.3 THOUSAND/UL (ref 3.8–10.8)

## 2025-03-27 NOTE — TELEPHONE ENCOUNTER
Called and provided update to pt from provider- Due to provider not having met this patient advice can not be provided. Dr. Mccain requests we keep consultation appointment as scheduled and to please defer current questions to her own doctor/or whoever prescribed the methimazole. Pt stated she appreciated the prompt calls and efforts to assist. That her PCP is also on vacation. She has placed a call to the hospital nurse that provides the discharge check in calls.       Called and spoke with pt and she stated she did not take her Methimazole today 3/27/25 and yesterday 3/26/25. Stated she is severely itchy all over and flushed. Denies: SOB, any facial or throat swelling, HA or vision changes. Stated she is immune to benadryl but has the benadryl cream. Pt stated her labs are the same as from when she was in the hospital and that in the last 3 weeks she has lost 20lbs. Asking for advise/next steps    Confirmed cell # [phone] and MyChart use.       Copied from CRM #[id number]. Topic: Needs Earlier Appointment  >> Mar 27, 2025  3:44 PM Soledad ELLIS wrote:  PT WAS PRESCRIBED A MEDICATION THAT ISN'T WORKING , SHE'S ALLERGIC TO THE MEDICATION, SHE'S LOSING WEIGHT, AND THE MEDICINE SEEMS TO NOT BE HELPING HER THYROID NUMBERS. A GOOD CB#: [phone].

## 2025-04-02 DIAGNOSIS — E87.6 HYPOKALEMIA: ICD-10-CM

## 2025-04-07 ENCOUNTER — PATIENT OUTREACH (OUTPATIENT)
Dept: CARE COORDINATION | Facility: CLINIC | Age: 45
End: 2025-04-07
Payer: COMMERCIAL

## 2025-04-08 ENCOUNTER — TELEPHONE (OUTPATIENT)
Dept: PRIMARY CARE | Facility: CLINIC | Age: 45
End: 2025-04-08
Payer: COMMERCIAL

## 2025-04-08 DIAGNOSIS — E87.6 HYPOKALEMIA: ICD-10-CM

## 2025-04-08 LAB
ANION GAP SERPL CALCULATED.4IONS-SCNC: 13 MMOL/L (CALC) (ref 7–17)
BUN SERPL-MCNC: 10 MG/DL (ref 7–25)
BUN/CREAT SERPL: 22 (CALC) (ref 6–22)
CALCIUM SERPL-MCNC: 10.1 MG/DL (ref 8.6–10.2)
CHLORIDE SERPL-SCNC: 99 MMOL/L (ref 98–110)
CO2 SERPL-SCNC: 25 MMOL/L (ref 20–32)
CREAT SERPL-MCNC: 0.45 MG/DL (ref 0.5–0.99)
EGFRCR SERPLBLD CKD-EPI 2021: 122 ML/MIN/1.73M2
GLUCOSE SERPL-MCNC: 138 MG/DL (ref 65–99)
POTASSIUM SERPL-SCNC: 3.4 MMOL/L (ref 3.5–5.3)
SODIUM SERPL-SCNC: 137 MMOL/L (ref 135–146)

## 2025-04-08 RX ORDER — POTASSIUM CHLORIDE 20 MEQ/1
20 TABLET, EXTENDED RELEASE ORAL DAILY
Qty: 30 TABLET | Refills: 2 | Status: SHIPPED | OUTPATIENT
Start: 2025-04-08 | End: 2025-07-07

## 2025-04-11 ENCOUNTER — APPOINTMENT (OUTPATIENT)
Dept: OBSTETRICS AND GYNECOLOGY | Facility: CLINIC | Age: 45
End: 2025-04-11
Payer: COMMERCIAL

## 2025-04-11 ENCOUNTER — APPOINTMENT (OUTPATIENT)
Dept: ENDOCRINOLOGY | Facility: CLINIC | Age: 45
End: 2025-04-11
Payer: COMMERCIAL

## 2025-04-11 VITALS
DIASTOLIC BLOOD PRESSURE: 86 MMHG | HEART RATE: 97 BPM | SYSTOLIC BLOOD PRESSURE: 141 MMHG | BODY MASS INDEX: 29.1 KG/M2 | WEIGHT: 154 LBS

## 2025-04-11 DIAGNOSIS — E05.90 HYPERTHYROIDISM: Primary | ICD-10-CM

## 2025-04-11 PROCEDURE — 99204 OFFICE O/P NEW MOD 45 MIN: CPT | Performed by: INTERNAL MEDICINE

## 2025-04-11 PROCEDURE — 3077F SYST BP >= 140 MM HG: CPT | Performed by: INTERNAL MEDICINE

## 2025-04-11 PROCEDURE — 3079F DIAST BP 80-89 MM HG: CPT | Performed by: INTERNAL MEDICINE

## 2025-04-11 PROCEDURE — 1036F TOBACCO NON-USER: CPT | Performed by: INTERNAL MEDICINE

## 2025-04-11 PROCEDURE — RXMED WILLOW AMBULATORY MEDICATION CHARGE

## 2025-04-11 RX ORDER — PROPYLTHIOURACIL 50 MG/1
100 TABLET ORAL 3 TIMES DAILY
Qty: 180 TABLET | Refills: 5 | Status: SHIPPED | OUTPATIENT
Start: 2025-04-11

## 2025-04-11 ASSESSMENT — ENCOUNTER SYMPTOMS
PALPITATIONS: 0
ABDOMINAL PAIN: 1
SORE THROAT: 1
ARTHRALGIAS: 1
WEAKNESS: 0
TREMORS: 1
NAUSEA: 1
CONSTIPATION: 0
NERVOUS/ANXIOUS: 1
FEVER: 0
VOMITING: 1
CONFUSION: 1
APNEA: 1
NECK PAIN: 0
HEADACHES: 0
MYALGIAS: 1
FATIGUE: 0
DYSPHORIC MOOD: 1
DIARRHEA: 1
UNEXPECTED WEIGHT CHANGE: 1
TROUBLE SWALLOWING: 1
SHORTNESS OF BREATH: 0

## 2025-04-11 ASSESSMENT — PATIENT HEALTH QUESTIONNAIRE - PHQ9
SUM OF ALL RESPONSES TO PHQ9 QUESTIONS 1 AND 2: 0
2. FEELING DOWN, DEPRESSED OR HOPELESS: NOT AT ALL
1. LITTLE INTEREST OR PLEASURE IN DOING THINGS: NOT AT ALL

## 2025-04-11 NOTE — LETTER
2025     Corinna Begum MD  [address]  [facility] [address]  [facility] [address]    Patient: Christi Calzada   YOB: 1980   Date of Visit: 2025       Dear Dr. Corinna Begum MD:    Thank you for referring Christi Calzada to me for evaluation. Below are my notes for this consultation.  If you have questions, please do not hesitate to call me. I look forward to following your patient along with you.       Sincerely,     Dawson Mccain MD      CC: No Recipients  ______________________________________________________________________________________    History Of Present Illness  Christi Calzada is a 44 y.o. female with hyperthyroidism    She presented to [facility] 1 month age with tachycardia and shortness of breath     Current weight loss, nausea, vomiting      Allergy to methimazole:  itching, all over and between toes.  Hives on back.   Stopped methimazole 2 weeks ago.  She had taken it for 2.5 weeks.     Past Medical History  She has a past medical history of Acute lymphadenitis, unspecified (2014), Body mass index (BMI) 34.0-34.9, adult (2022), Body mass index (BMI)30.0-30.9, adult (2021), Candidiasis, unspecified (2014), Encounter for screening for lipoid disorders (2014), Encounter for screening for malignant neoplasm of vagina, Other conditions influencing health status, Other conditions influencing health status, Pain in right knee (2018), Paresthesia of skin (2014), Personal history of other diseases of the nervous system and sense organs (2013), and Radiculopathy, cervical region (2018).    She has no past medical history of Personal history of irradiation.    Surgical History  She has a past surgical history that includes Other surgical history (2020); Other surgical history (2020);  section, classic (2014); Other surgical history (2014); and Hernia repair (2014).     Social History  She  reports that she has never smoked. She has been exposed to tobacco smoke. She has never used smokeless tobacco. She reports current alcohol use. She reports that she does not use drugs.    Family History  Family History   Problem Relation Name Age of Onset   • Migraines Father     • Colon cancer Father     • Asthma Son     • Stroke Paternal Grandfather     • Diabetes Other Grandfather        Allergies  Patient has no known allergies.    Medications  Current Outpatient Medications   Medication Instructions   • chlorthalidone (HYGROTON) 25 mg, oral, Daily   • hydrOXYzine HCL (ATARAX) 25 mg, oral, Every 8 hours PRN   • methIMAzole (TAPAZOLE) 30 mg, oral, Daily   • metoprolol succinate XL (TOPROL-XL) 12.5 mg, oral, Daily, Do not crush or chew.   • potassium chloride CR (Klor-Con M20) 20 mEq ER tablet 20 mEq, oral, Daily, Do not crush or chew.   • traZODone (DESYREL) 50 mg, oral, Nightly PRN       Review of Systems   Constitutional:  Positive for unexpected weight change. Negative for fatigue and fever.   HENT:  Positive for sore throat, tinnitus and trouble swallowing.    Eyes:  Negative for visual disturbance.   Respiratory:  Positive for apnea (sleep). Negative for shortness of breath.    Cardiovascular:  Negative for chest pain and palpitations.   Gastrointestinal:  Positive for abdominal pain, diarrhea, nausea and vomiting. Negative for constipation.   Endocrine: Negative for cold intolerance and heat intolerance.   Musculoskeletal:  Positive for arthralgias and myalgias. Negative for neck pain.   Skin:  Negative for rash.   Neurological:  Positive for tremors. Negative for weakness and headaches.   Psychiatric/Behavioral:  Positive for confusion and dysphoric mood. The patient is nervous/anxious.          Last Recorded Vitals  Blood pressure 141/86, pulse 97, weight 69.9 kg (154 lb).    Physical Exam  Constitutional:       General: She is not in acute distress.  HENT:      Head: Normocephalic.      Mouth/Throat:       Mouth: Mucous membranes are moist.   Eyes:      Extraocular Movements: Extraocular movements intact.      Comments: No proptosis or periorbital edema   Neck:      Thyroid: No thyroid mass.      Comments: Thyroid upper limit normal size, no nodule, mobile with swallow.   Cardiovascular:      Pulses:           Radial pulses are 2+ on the right side and 2+ on the left side.   Musculoskeletal:      Right lower leg: No edema.      Left lower leg: No edema.   Lymphadenopathy:      Cervical: No cervical adenopathy.   Neurological:      Mental Status: She is alert.      Motor: No tremor.   Psychiatric:         Mood and Affect: Affect normal.          Relevant Results  Lab Results   Component Value Date    TSH <0.01 (L) 03/25/2025    FREET4 3.4 (H) 03/25/2025    T3FREE 9.9 (H) 03/25/2025    RECE 16.60 (H) 03/06/2025       IMPRESSION  HYPERTHYROIDISM  GRAVES' DISEASE  Symptomatic hyperthyroidism with antibody evidence of Graves' disease  Modest goiter  Advised Graves' disease is an autoimmune stimulation of the thyroid.  Advised Graves' remits spontaneously in a minority of cases, but otherwise may become a chronic illness.  Risks of untreated Graves' disease include arrhythmia, atrial fibrillation, heart failure, osteoporosis.  Apparent allergic reaction to methimazole, although hives are common in Graves' disease regardless of medication.      RECOMMENDATIONS  Propylthiouracil (PTU) 100 mg three times daily.  Call if any fever, sore throat, infection or rash while taking PTU.     Discussed permanent options of surgery or radioactive iodine therapy.  Discussed surgical risks of bleeding, anesthesia reaction, hypoparathyroidism, laryngeal nerve injury.  Discussed side effects of 131-iodine including nausea, neck tenderness, worsening of orbitopathy, incomplete ablation.  Discussed the goal of surgery or 131-iodine is permanent hypothyroidism requiring thyroid hormone replacement.   Follow up 3-4 weeks  Draw blood before  next appointment.

## 2025-04-11 NOTE — PROGRESS NOTES
History Of Present Illness  Christi Calzada is a 44 y.o. female with hyperthyroidism    She presented to [facility] 1 month age with tachycardia and shortness of breath     Current weight loss, nausea, vomiting      Allergy to methimazole:  itching, all over and between toes.  Hives on back.   Stopped methimazole 2 weeks ago.  She had taken it for 2.5 weeks.     Past Medical History  She has a past medical history of Acute lymphadenitis, unspecified (2014), Body mass index (BMI) 34.0-34.9, adult (2022), Body mass index (BMI)30.0-30.9, adult (2021), Candidiasis, unspecified (2014), Encounter for screening for lipoid disorders (2014), Encounter for screening for malignant neoplasm of vagina, Other conditions influencing health status, Other conditions influencing health status, Pain in right knee (2018), Paresthesia of skin (2014), Personal history of other diseases of the nervous system and sense organs (2013), and Radiculopathy, cervical region (2018).    She has no past medical history of Personal history of irradiation.    Surgical History  She has a past surgical history that includes Other surgical history (2020); Other surgical history (2020);  section, classic (2014); Other surgical history (2014); and Hernia repair (2014).     Social History  She reports that she has never smoked. She has been exposed to tobacco smoke. She has never used smokeless tobacco. She reports current alcohol use. She reports that she does not use drugs.    Family History  Family History   Problem Relation Name Age of Onset    Migraines Father      Colon cancer Father      Asthma Son      Stroke Paternal Grandfather      Diabetes Other Grandfather        Allergies  Patient has no known allergies.    Medications  Current Outpatient Medications   Medication Instructions    chlorthalidone (HYGROTON) 25 mg, oral, Daily    hydrOXYzine HCL (ATARAX) 25  mg, oral, Every 8 hours PRN    methIMAzole (TAPAZOLE) 30 mg, oral, Daily    metoprolol succinate XL (TOPROL-XL) 12.5 mg, oral, Daily, Do not crush or chew.    potassium chloride CR (Klor-Con M20) 20 mEq ER tablet 20 mEq, oral, Daily, Do not crush or chew.    traZODone (DESYREL) 50 mg, oral, Nightly PRN       Review of Systems   Constitutional:  Positive for unexpected weight change. Negative for fatigue and fever.   HENT:  Positive for sore throat, tinnitus and trouble swallowing.    Eyes:  Negative for visual disturbance.   Respiratory:  Positive for apnea (sleep). Negative for shortness of breath.    Cardiovascular:  Negative for chest pain and palpitations.   Gastrointestinal:  Positive for abdominal pain, diarrhea, nausea and vomiting. Negative for constipation.   Endocrine: Negative for cold intolerance and heat intolerance.   Musculoskeletal:  Positive for arthralgias and myalgias. Negative for neck pain.   Skin:  Negative for rash.   Neurological:  Positive for tremors. Negative for weakness and headaches.   Psychiatric/Behavioral:  Positive for confusion and dysphoric mood. The patient is nervous/anxious.          Last Recorded Vitals  Blood pressure 141/86, pulse 97, weight 69.9 kg (154 lb).    Physical Exam  Constitutional:       General: She is not in acute distress.  HENT:      Head: Normocephalic.      Mouth/Throat:      Mouth: Mucous membranes are moist.   Eyes:      Extraocular Movements: Extraocular movements intact.      Comments: No proptosis or periorbital edema   Neck:      Thyroid: No thyroid mass.      Comments: Thyroid upper limit normal size, no nodule, mobile with swallow.   Cardiovascular:      Pulses:           Radial pulses are 2+ on the right side and 2+ on the left side.   Musculoskeletal:      Right lower leg: No edema.      Left lower leg: No edema.   Lymphadenopathy:      Cervical: No cervical adenopathy.   Neurological:      Mental Status: She is alert.      Motor: No tremor.    Psychiatric:         Mood and Affect: Affect normal.          Relevant Results  Lab Results   Component Value Date    TSH <0.01 (L) 03/25/2025    FREET4 3.4 (H) 03/25/2025    T3FREE 9.9 (H) 03/25/2025    RECE 16.60 (H) 03/06/2025       IMPRESSION  HYPERTHYROIDISM  GRAVES' DISEASE  Symptomatic hyperthyroidism with antibody evidence of Graves' disease  Modest goiter  Advised Graves' disease is an autoimmune stimulation of the thyroid.  Advised Graves' remits spontaneously in a minority of cases, but otherwise may become a chronic illness.  Risks of untreated Graves' disease include arrhythmia, atrial fibrillation, heart failure, osteoporosis.  Apparent allergic reaction to methimazole, although hives are common in Graves' disease regardless of medication.      RECOMMENDATIONS  Propylthiouracil (PTU) 100 mg three times daily.  Call if any fever, sore throat, infection or rash while taking PTU.     Discussed permanent options of surgery or radioactive iodine therapy.  Discussed surgical risks of bleeding, anesthesia reaction, hypoparathyroidism, laryngeal nerve injury.  Discussed side effects of 131-iodine including nausea, neck tenderness, worsening of orbitopathy, incomplete ablation.  Discussed the goal of surgery or 131-iodine is permanent hypothyroidism requiring thyroid hormone replacement.   Follow up 3-4 weeks  Draw blood before next appointment.

## 2025-04-11 NOTE — PATIENT INSTRUCTIONS
RECOMMENDATIONS  Propylthiouracil (PTU) 100 mg three times daily.  Call if any fever, sore throat, infection or rash while taking PTU.     Discussed permanent options of surgery or radioactive iodine therapy.    Follow up 3-4 weeks  Draw blood before next appointment.

## 2025-04-14 DIAGNOSIS — E87.6 HYPOKALEMIA: ICD-10-CM

## 2025-04-14 PROCEDURE — RXMED WILLOW AMBULATORY MEDICATION CHARGE

## 2025-04-14 RX ORDER — POTASSIUM CHLORIDE 20 MEQ/1
20 TABLET, EXTENDED RELEASE ORAL DAILY
Qty: 30 TABLET | Refills: 2 | Status: SHIPPED | OUTPATIENT
Start: 2025-04-14 | End: 2025-07-13

## 2025-04-25 ENCOUNTER — PHARMACY VISIT (OUTPATIENT)
Dept: PHARMACY | Facility: CLINIC | Age: 45
End: 2025-04-25
Payer: COMMERCIAL

## 2025-05-01 DIAGNOSIS — E05.90 HYPERTHYROIDISM: ICD-10-CM

## 2025-05-21 PROCEDURE — RXMED WILLOW AMBULATORY MEDICATION CHARGE

## 2025-05-27 ENCOUNTER — PHARMACY VISIT (OUTPATIENT)
Dept: PHARMACY | Facility: CLINIC | Age: 45
End: 2025-05-27
Payer: COMMERCIAL

## 2025-05-28 ENCOUNTER — APPOINTMENT (OUTPATIENT)
Dept: ENDOCRINOLOGY | Facility: CLINIC | Age: 45
End: 2025-05-28
Payer: COMMERCIAL

## 2025-06-06 ENCOUNTER — APPOINTMENT (OUTPATIENT)
Dept: PRIMARY CARE | Facility: CLINIC | Age: 45
End: 2025-06-06
Payer: COMMERCIAL

## 2025-06-06 VITALS
HEIGHT: 61 IN | SYSTOLIC BLOOD PRESSURE: 132 MMHG | BODY MASS INDEX: 28.32 KG/M2 | DIASTOLIC BLOOD PRESSURE: 87 MMHG | HEART RATE: 81 BPM | WEIGHT: 150 LBS

## 2025-06-06 DIAGNOSIS — E87.6 HYPOKALEMIA: ICD-10-CM

## 2025-06-06 DIAGNOSIS — E05.90 HYPERTHYROIDISM: ICD-10-CM

## 2025-06-06 DIAGNOSIS — R92.30 DENSE BREAST TISSUE ON MAMMOGRAM, UNSPECIFIED TYPE: ICD-10-CM

## 2025-06-06 DIAGNOSIS — R92.8 ABNORMAL MAMMOGRAM: ICD-10-CM

## 2025-06-06 DIAGNOSIS — Z00.00 HEALTHCARE MAINTENANCE: Primary | ICD-10-CM

## 2025-06-06 DIAGNOSIS — R00.0 TACHYCARDIA: ICD-10-CM

## 2025-06-06 ASSESSMENT — ENCOUNTER SYMPTOMS
DEPRESSION: 0
LOSS OF SENSATION IN FEET: 0
OCCASIONAL FEELINGS OF UNSTEADINESS: 0

## 2025-06-06 NOTE — PROGRESS NOTES
I reviewed the resident/fellow's documentation and discussed the patient with the resident/fellow. I agree with the resident/fellow's medical decision making as documented in the note.  As the attending physician, I certify that I personally reviewed the patient's history and personally examined the patient to confirm the physical findings described above, and that I reviewed the relevant imaging studies and available reports. I also discussed the differential diagnosis and all of the proposed management plans with the patient and individuals accompanying the patient to this visit. They had the opportunity to ask questions about the proposed management plans and to have those questions answered.     Corinna Begum MD

## 2025-06-06 NOTE — PROGRESS NOTES
"Melanie Calzada is a 44 y.o. female presenting for routine follow up. She states that she has been managing her new diagnosis of Grave's disease. She was concerned about her eyes \"popping out\" and was reassured that this is unlikely to occur if treated appropriately. Otherwise, she noted anxiety about a letter she received from the radiology department regarding dense breast tissue noted on mammography. She says that she has noticed pruritus around her nipple for the last 2 weeks and increased tenderness/soreness of the breasts bilaterally.    Objective    Physical Exam  Constitutional:       General: She is not in acute distress.     Appearance: Normal appearance. She is not ill-appearing.   HENT:      Head: Normocephalic and atraumatic.      Nose: Nose normal.      Mouth/Throat:      Mouth: Mucous membranes are moist.      Pharynx: Oropharynx is clear.   Eyes:      Extraocular Movements: Extraocular movements intact.      Pupils: Pupils are equal, round, and reactive to light.   Cardiovascular:      Rate and Rhythm: Regular rhythm. Tachycardia present.      Pulses: Normal pulses.      Heart sounds: Normal heart sounds.   Pulmonary:      Effort: Pulmonary effort is normal.      Breath sounds: Normal breath sounds.   Abdominal:      General: Abdomen is flat. Bowel sounds are normal.      Palpations: Abdomen is soft.   Musculoskeletal:         General: Normal range of motion.      Right lower leg: No edema.      Left lower leg: No edema.   Skin:     General: Skin is warm and dry.      Capillary Refill: Capillary refill takes less than 2 seconds.   Neurological:      General: No focal deficit present.      Mental Status: She is alert and oriented to person, place, and time. Mental status is at baseline.     Heart Rate:  [81] 81  BP: (132)/(87) 132/87  Vitals:    06/06/25 1338   Weight: 68 kg (150 lb)     Labs:  CBC:  Recent Labs     03/25/25  0801 03/06/25  0608 03/05/25  1728   WBC 5.3 4.1* 5.1   HGB " 12.6 11.2* 13.0   HCT 38.1 32.8* 38.3    238 319   MCV 81.9 81 81     CMP:  Recent Labs     04/07/25  0837 03/25/25  0801 03/06/25  0608    139 141   K 3.4* 2.7* 3.9   CL 99 100 108*   CO2 25 29 25   ANIONGAP 13 10 12   BUN 10 12 15   CREATININE 0.45* 0.45* 0.36*   EGFR 122 122 >90   GLUCOSE 138* 121* 90     Recent Labs     03/25/25  0801 03/05/25  1728 01/31/24  1455 12/18/21  0923 03/25/20  1524   ALBUMIN 3.9 4.0 4.4   < > 4.1   ALKPHOS 58 60 55   < > 56   ALT 21 36 29   < > 11   AST 19 29 17   < > 15   BILITOT 0.6 0.6 0.2   < > 0.3   LIPASE  --   --   --   --  12    < > = values in this interval not displayed.     Calcium/Phos:   Lab Results   Component Value Date    CALCIUM 10.1 04/07/2025      COAG:   Recent Labs     03/06/25  0025   DDIMERVTE 298     ENDO:  Recent Labs     03/25/25  0804 03/25/25  0801 03/05/25  1728 07/12/23  1502 02/07/22  0947   TSH <0.01* <0.01* <0.01* 1.00  --    HGBA1C  --  5.8*  --  5.9* 5.6      CARDIAC:   Recent Labs     03/06/25  0025 03/05/25  1728   TROPHS 4 3     Recent Labs     03/25/25  0801 07/12/23  1502 02/07/22  0947   CHOL 141 188 179   LDLF  --  117* 116*   LDLCALC 82  --   --    HDL 41* 55.1 52.0   TRIG 90 81 56     Imaging:  No results found.    Meds:  Current Outpatient Medications   Medication Instructions    chlorthalidone (HYGROTON) 25 mg, oral, Daily    hydrOXYzine HCL (ATARAX) 25 mg, oral, Every 8 hours PRN    potassium chloride CR (Klor-Con M20) 20 mEq ER tablet 20 mEq, oral, Daily, Do not crush or chew.    propylthiouracil (PTU) 100 mg, oral, 3 times daily    traZODone (DESYREL) 50 mg, oral, Nightly PRN     Assessment    Christi Calzada is a 44 y.o. female with a history of hyperthyroidism (Grave's), hypertension, and hypovitaminosis D. She was noted to be hypokalemic. Lipids are a bit up, but with low overall risk, likely OK to manage conservatively at this time. Low suspicion for malignant etiology of breast density, however due to legislative  requirements on patient information, her negative findings were interpreted as needing further investigation.    # Increased breast tissue density  - Referral to breast surgery    # Hyperthyroidism  - On PTU  - Labs ordered have not been completed  - Needs to see endocrinology    # Hypertension  - Chlorthalidone 25mg    # Hypokalemia  - Continue 20mEq supplementation  - RFP    # Routine health maintenance  - Colon cancer screen 12/2027  - Mammography due 2/2026  - CMP due 12/2026  - A1c, vitamin D, urine albumin, CBC due 9/2025    Fuad Anna MD MS  PGY2 Internal Medicine

## 2025-06-16 ENCOUNTER — APPOINTMENT (OUTPATIENT)
Dept: SURGICAL ONCOLOGY | Facility: CLINIC | Age: 45
End: 2025-06-16
Payer: COMMERCIAL

## 2025-06-17 ENCOUNTER — HOSPITAL ENCOUNTER (EMERGENCY)
Facility: HOSPITAL | Age: 45
Discharge: HOME | End: 2025-06-18
Attending: EMERGENCY MEDICINE
Payer: COMMERCIAL

## 2025-06-17 DIAGNOSIS — R51.9 NONINTRACTABLE HEADACHE, UNSPECIFIED CHRONICITY PATTERN, UNSPECIFIED HEADACHE TYPE: Primary | ICD-10-CM

## 2025-06-17 PROCEDURE — 99285 EMERGENCY DEPT VISIT HI MDM: CPT | Performed by: EMERGENCY MEDICINE

## 2025-06-17 ASSESSMENT — PAIN DESCRIPTION - PAIN TYPE
TYPE: ACUTE PAIN
TYPE: ACUTE PAIN

## 2025-06-17 ASSESSMENT — PAIN DESCRIPTION - ORIENTATION
ORIENTATION: LEFT
ORIENTATION: LEFT

## 2025-06-17 ASSESSMENT — PAIN - FUNCTIONAL ASSESSMENT
PAIN_FUNCTIONAL_ASSESSMENT: 0-10
PAIN_FUNCTIONAL_ASSESSMENT: 0-10

## 2025-06-17 ASSESSMENT — PAIN SCALES - GENERAL
PAINLEVEL_OUTOF10: 8
PAINLEVEL_OUTOF10: 8

## 2025-06-17 ASSESSMENT — PAIN DESCRIPTION - LOCATION
LOCATION: HEAD
LOCATION: HEAD

## 2025-06-17 NOTE — Clinical Note
Christi Calzada was seen and treated in our emergency department on 6/17/2025.  She may return to work on 06/19/2025.       If you have any questions or concerns, please don't hesitate to call.      Benjamin Sullivan MD

## 2025-06-18 ENCOUNTER — APPOINTMENT (OUTPATIENT)
Dept: RADIOLOGY | Facility: HOSPITAL | Age: 45
End: 2025-06-18
Payer: COMMERCIAL

## 2025-06-18 VITALS
RESPIRATION RATE: 17 BRPM | DIASTOLIC BLOOD PRESSURE: 92 MMHG | WEIGHT: 150 LBS | BODY MASS INDEX: 28.32 KG/M2 | HEART RATE: 83 BPM | HEIGHT: 61 IN | OXYGEN SATURATION: 96 % | SYSTOLIC BLOOD PRESSURE: 116 MMHG | TEMPERATURE: 98.5 F

## 2025-06-18 LAB
ALBUMIN SERPL BCP-MCNC: 3.6 G/DL (ref 3.4–5)
ALP SERPL-CCNC: 98 U/L (ref 33–110)
ALT SERPL W P-5'-P-CCNC: 15 U/L (ref 7–45)
ANION GAP SERPL CALC-SCNC: 10 MMOL/L (ref 10–20)
AST SERPL W P-5'-P-CCNC: 20 U/L (ref 9–39)
B-HCG SERPL-ACNC: <2 MIU/ML
BASOPHILS # BLD AUTO: 0.03 X10*3/UL (ref 0–0.1)
BASOPHILS NFR BLD AUTO: 0.4 %
BILIRUB SERPL-MCNC: 0.3 MG/DL (ref 0–1.2)
BUN SERPL-MCNC: 10 MG/DL (ref 6–23)
CALCIUM SERPL-MCNC: 9 MG/DL (ref 8.6–10.3)
CHLORIDE SERPL-SCNC: 104 MMOL/L (ref 98–107)
CO2 SERPL-SCNC: 27 MMOL/L (ref 21–32)
CREAT SERPL-MCNC: 0.46 MG/DL (ref 0.5–1.05)
EGFRCR SERPLBLD CKD-EPI 2021: >90 ML/MIN/1.73M*2
EOSINOPHIL # BLD AUTO: 0.04 X10*3/UL (ref 0–0.7)
EOSINOPHIL NFR BLD AUTO: 0.5 %
ERYTHROCYTE [DISTWIDTH] IN BLOOD BY AUTOMATED COUNT: 13.6 % (ref 11.5–14.5)
ERYTHROCYTE [SEDIMENTATION RATE] IN BLOOD BY WESTERGREN METHOD: 14 MM/H (ref 0–20)
GLUCOSE SERPL-MCNC: 121 MG/DL (ref 74–99)
HCT VFR BLD AUTO: 35.7 % (ref 36–46)
HGB BLD-MCNC: 11.8 G/DL (ref 12–16)
IMM GRANULOCYTES # BLD AUTO: 0.03 X10*3/UL (ref 0–0.7)
IMM GRANULOCYTES NFR BLD AUTO: 0.4 % (ref 0–0.9)
LYMPHOCYTES # BLD AUTO: 2.31 X10*3/UL (ref 1.2–4.8)
LYMPHOCYTES NFR BLD AUTO: 30.5 %
MCH RBC QN AUTO: 27.4 PG (ref 26–34)
MCHC RBC AUTO-ENTMCNC: 33.1 G/DL (ref 32–36)
MCV RBC AUTO: 83 FL (ref 80–100)
MONOCYTES # BLD AUTO: 0.48 X10*3/UL (ref 0.1–1)
MONOCYTES NFR BLD AUTO: 6.3 %
NEUTROPHILS # BLD AUTO: 4.69 X10*3/UL (ref 1.2–7.7)
NEUTROPHILS NFR BLD AUTO: 61.9 %
NRBC BLD-RTO: 0 /100 WBCS (ref 0–0)
PLATELET # BLD AUTO: 337 X10*3/UL (ref 150–450)
POTASSIUM SERPL-SCNC: 3.8 MMOL/L (ref 3.5–5.3)
PROT SERPL-MCNC: 6.9 G/DL (ref 6.4–8.2)
RBC # BLD AUTO: 4.3 X10*6/UL (ref 4–5.2)
SODIUM SERPL-SCNC: 137 MMOL/L (ref 136–145)
T4 FREE SERPL-MCNC: 2.3 NG/DL (ref 0.61–1.12)
TSH SERPL-ACNC: <0.01 MIU/L (ref 0.44–3.98)
WBC # BLD AUTO: 7.6 X10*3/UL (ref 4.4–11.3)

## 2025-06-18 PROCEDURE — 36415 COLL VENOUS BLD VENIPUNCTURE: CPT | Performed by: EMERGENCY MEDICINE

## 2025-06-18 PROCEDURE — 70496 CT ANGIOGRAPHY HEAD: CPT | Performed by: STUDENT IN AN ORGANIZED HEALTH CARE EDUCATION/TRAINING PROGRAM

## 2025-06-18 PROCEDURE — 84443 ASSAY THYROID STIM HORMONE: CPT | Performed by: EMERGENCY MEDICINE

## 2025-06-18 PROCEDURE — 84702 CHORIONIC GONADOTROPIN TEST: CPT | Performed by: EMERGENCY MEDICINE

## 2025-06-18 PROCEDURE — 70498 CT ANGIOGRAPHY NECK: CPT

## 2025-06-18 PROCEDURE — 2550000001 HC RX 255 CONTRASTS: Performed by: EMERGENCY MEDICINE

## 2025-06-18 PROCEDURE — 2500000004 HC RX 250 GENERAL PHARMACY W/ HCPCS (ALT 636 FOR OP/ED): Performed by: EMERGENCY MEDICINE

## 2025-06-18 PROCEDURE — 96361 HYDRATE IV INFUSION ADD-ON: CPT

## 2025-06-18 PROCEDURE — 70498 CT ANGIOGRAPHY NECK: CPT | Performed by: STUDENT IN AN ORGANIZED HEALTH CARE EDUCATION/TRAINING PROGRAM

## 2025-06-18 PROCEDURE — 96374 THER/PROPH/DIAG INJ IV PUSH: CPT | Mod: 59

## 2025-06-18 PROCEDURE — 85025 COMPLETE CBC W/AUTO DIFF WBC: CPT | Performed by: EMERGENCY MEDICINE

## 2025-06-18 PROCEDURE — 84075 ASSAY ALKALINE PHOSPHATASE: CPT | Performed by: EMERGENCY MEDICINE

## 2025-06-18 PROCEDURE — 96375 TX/PRO/DX INJ NEW DRUG ADDON: CPT

## 2025-06-18 PROCEDURE — 85652 RBC SED RATE AUTOMATED: CPT | Performed by: EMERGENCY MEDICINE

## 2025-06-18 PROCEDURE — 84439 ASSAY OF FREE THYROXINE: CPT | Performed by: EMERGENCY MEDICINE

## 2025-06-18 RX ORDER — DIPHENHYDRAMINE HYDROCHLORIDE 50 MG/ML
25 INJECTION, SOLUTION INTRAMUSCULAR; INTRAVENOUS ONCE
Status: COMPLETED | OUTPATIENT
Start: 2025-06-18 | End: 2025-06-18

## 2025-06-18 RX ORDER — METOCLOPRAMIDE HYDROCHLORIDE 5 MG/ML
10 INJECTION INTRAMUSCULAR; INTRAVENOUS ONCE
Status: COMPLETED | OUTPATIENT
Start: 2025-06-18 | End: 2025-06-18

## 2025-06-18 RX ADMIN — METOCLOPRAMIDE 10 MG: 5 INJECTION, SOLUTION INTRAMUSCULAR; INTRAVENOUS at 01:29

## 2025-06-18 RX ADMIN — IOHEXOL 75 ML: 350 INJECTION, SOLUTION INTRAVENOUS at 02:20

## 2025-06-18 RX ADMIN — SODIUM CHLORIDE 1000 ML: 0.9 INJECTION, SOLUTION INTRAVENOUS at 01:31

## 2025-06-18 RX ADMIN — DIPHENHYDRAMINE HYDROCHLORIDE 25 MG: 50 INJECTION, SOLUTION INTRAMUSCULAR; INTRAVENOUS at 01:29

## 2025-06-18 ASSESSMENT — PAIN SCALES - GENERAL: PAINLEVEL_OUTOF10: 0 - NO PAIN

## 2025-06-18 NOTE — ED TRIAGE NOTES
Patient comes in stating she is having a bad headache on the left side that came on quickly. Patient denies history of migraines. Patient having double vision in her left eye as well.

## 2025-06-18 NOTE — ED PROVIDER NOTES
HPI   Chief Complaint   Patient presents with    Headache       The patient has a history of hypertension Graves' disease presents with headache.  No prior history of headaches in the past.  She denies any trauma, domestic violence or fall.  She denies any fever, cough congestion.  States he feels a headache on the left side of her temple forehead.  She has no new cough or congestion.              Patient History   Medical History[1]  Surgical History[2]  Family History[3]  Social History[4]    Physical Exam   ED Triage Vitals [06/17/25 2244]   Temperature Heart Rate Respirations BP   36.9 °C (98.5 °F) 73 18 128/82      Pulse Ox Temp Source Heart Rate Source Patient Position   99 % Oral Monitor --      BP Location FiO2 (%)     -- --       Physical Exam  Vitals and nursing note reviewed.   Constitutional:       General: She is not in acute distress.     Appearance: She is well-developed.   HENT:      Head: Normocephalic and atraumatic.      Comments: Some tenderness overlying the left temple     Nose: Nose normal.      Mouth/Throat:      Mouth: Mucous membranes are moist.   Eyes:      General: No visual field deficit.     Conjunctiva/sclera: Conjunctivae normal.   Cardiovascular:      Rate and Rhythm: Regular rhythm.      Heart sounds: No murmur heard.  Pulmonary:      Effort: Pulmonary effort is normal. No respiratory distress.   Abdominal:      General: There is no distension.      Palpations: Abdomen is soft.      Tenderness: There is no right CVA tenderness or left CVA tenderness.   Musculoskeletal:         General: No swelling.      Cervical back: Neck supple.   Skin:     General: Skin is warm and dry.   Neurological:      General: No focal deficit present.      Mental Status: She is alert and oriented to person, place, and time.      GCS: GCS eye subscore is 4. GCS verbal subscore is 5. GCS motor subscore is 6.      Cranial Nerves: No cranial nerve deficit, dysarthria or facial asymmetry.      Sensory: No  sensory deficit.      Motor: No weakness.      Coordination: Coordination normal.   Psychiatric:         Mood and Affect: Mood normal.           ED Course & MDM   Diagnoses as of 06/18/25 0124   Nonintractable headache, unspecified chronicity pattern, unspecified headache type                 No data recorded     [place] Coma Scale Score: 15 (06/17/25 2258 : Clarence Beth, REHAN)                           Medical Decision Making  The patient is on the young side for temporal arteritis I did order the ESR.  With her rapid onset of worst ever headache I did obtain a CT angio of the head.  It has been approximately 5 hours since the headache but given we will wait for pregnancy test a CT angio was ordered to rule out any aneurysmal bleeding.  Patient has an H of 0 and no focal deficits.  Ultimate the CT is negative and patient feels improved will be able to be discharged home.  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  Patient is informed of this.  Patient will follow-up with her endocrinologist    Procedure  Procedures         [1]   Past Medical History:  Diagnosis Date    Acute lymphadenitis, unspecified 07/01/2014    Lymphadenitis, acute    Body mass index (BMI) 34.0-34.9, adult 02/07/2022    BMI 34.0-34.9,adult    Body mass index (BMI)30.0-30.9, adult 01/25/2021    Body mass index (BMI) of 30.0 to 30.9 in adult    Candidiasis, unspecified 09/16/2014    Yeast infection    Encounter for screening for lipoid disorders 07/01/2014    Lipid screening    Encounter for screening for malignant neoplasm of vagina     Vaginal Pap smear    Other conditions influencing health status     History of pregnancy    Other conditions influencing health status     Menstruation    Pain in right knee 06/16/2018    Bilateral knee pain    Paresthesia of skin 07/01/2014    Paresthesia of foot    Personal history of other diseases of the nervous system and sense organs 04/22/2013    History of earache    Radiculopathy, cervical region 06/16/2018     Cervical radiculopathy   [2]   Past Surgical History:  Procedure Laterality Date     SECTION, CLASSIC  2014     Section    HERNIA REPAIR  2014    Hernia Repair    OTHER SURGICAL HISTORY  2020    Hysteroscopy    OTHER SURGICAL HISTORY  2020    Dilation and curettage    OTHER SURGICAL HISTORY  2014    Oophorectomy Unilateral Right Side   [3]   Family History  Problem Relation Name Age of Onset    Migraines Father      Colon cancer Father      Asthma Son      Stroke Paternal Grandfather      Diabetes Other Grandfather    [4]   Social History  Tobacco Use    Smoking status: Never     Passive exposure: Past    Smokeless tobacco: Never   Vaping Use    Vaping status: Never Used   Substance Use Topics    Alcohol use: Yes     Comment: Occasionally    Drug use: Never        Benjamin Sullivan MD  [phone]

## 2025-06-18 NOTE — DISCHARGE INSTRUCTIONS
You do have enlarged thymic tissue please follow-up with your endocrinologist or your primary doctor who manages your hypothyroidism.

## 2025-06-24 ENCOUNTER — APPOINTMENT (OUTPATIENT)
Dept: ENDOCRINOLOGY | Facility: CLINIC | Age: 45
End: 2025-06-24
Payer: COMMERCIAL

## 2025-06-24 VITALS — HEART RATE: 76 BPM | DIASTOLIC BLOOD PRESSURE: 69 MMHG | SYSTOLIC BLOOD PRESSURE: 160 MMHG

## 2025-06-24 DIAGNOSIS — E05.90 HYPERTHYROIDISM: ICD-10-CM

## 2025-06-24 PROCEDURE — 3078F DIAST BP <80 MM HG: CPT | Performed by: INTERNAL MEDICINE

## 2025-06-24 PROCEDURE — 1036F TOBACCO NON-USER: CPT | Performed by: INTERNAL MEDICINE

## 2025-06-24 PROCEDURE — 99214 OFFICE O/P EST MOD 30 MIN: CPT | Performed by: INTERNAL MEDICINE

## 2025-06-24 PROCEDURE — 3077F SYST BP >= 140 MM HG: CPT | Performed by: INTERNAL MEDICINE

## 2025-06-24 RX ORDER — PROPYLTHIOURACIL 50 MG/1
100-150 TABLET ORAL 3 TIMES DAILY
Qty: 270 TABLET | Refills: 11 | Status: SHIPPED | OUTPATIENT
Start: 2025-06-24 | End: 2026-06-24

## 2025-06-24 ASSESSMENT — ENCOUNTER SYMPTOMS
NERVOUS/ANXIOUS: 0
UNEXPECTED WEIGHT CHANGE: 0
DIARRHEA: 0
NAUSEA: 0
FEVER: 0
ABDOMINAL PAIN: 0
CONSTIPATION: 0
SHORTNESS OF BREATH: 0
WEAKNESS: 0
TROUBLE SWALLOWING: 0
FATIGUE: 0
PALPITATIONS: 0
HEADACHES: 0
VOMITING: 0
TREMORS: 0
NECK PAIN: 0

## 2025-06-24 NOTE — LETTER
2025     Corinna Begum MD  [address]  [facility] [address]  [facility] [address]    Patient: Christi Calzada   YOB: 1980   Date of Visit: 2025       Dear Dr. Corinna Begum MD:    Thank you for referring Christi Calzada to me for evaluation. Below are my notes for this consultation.  If you have questions, please do not hesitate to call me. I look forward to following your patient along with you.       Sincerely,     Dawson Mccain MD      CC: No Recipients  ______________________________________________________________________________________    History Of Present Illness  Christi Calzada is a 44 y.o. female hyperthyroidism, Graves' disease      mg TID    Weight loss stabilized      Allergy to methimazole:  itching, all over and between toes.  Hives on back.   Stopped methimazole 2 weeks ago.  She had taken it for 2.5 weeks.     ED visit to [facility] for headache.  Thymus enlargement noted on imaging.    Past Medical History  She has a past medical history of Acute lymphadenitis, unspecified (2014), Body mass index (BMI) 34.0-34.9, adult (2022), Body mass index (BMI)30.0-30.9, adult (2021), Candidiasis, unspecified (2014), Encounter for screening for lipoid disorders (2014), Encounter for screening for malignant neoplasm of vagina, Other conditions influencing health status, Other conditions influencing health status, Pain in right knee (2018), Paresthesia of skin (2014), Personal history of other diseases of the nervous system and sense organs (2013), and Radiculopathy, cervical region (2018).    She has no past medical history of Personal history of irradiation.    Surgical History  She has a past surgical history that includes Other surgical history (2020); Other surgical history (2020);  section, classic (2014); Other surgical history (2014); and Hernia repair (2014).     Social  History  She reports that she has never smoked. She has been exposed to tobacco smoke. She has never used smokeless tobacco. She reports current alcohol use. She reports that she does not use drugs.    Family History  Family History[1]    Allergies  Patient has no known allergies.    Medications  Current Outpatient Medications   Medication Instructions   • chlorthalidone (HYGROTON) 25 mg, oral, Daily   • hydrOXYzine HCL (ATARAX) 25 mg, oral, Every 8 hours PRN   • potassium chloride CR (Klor-Con M20) 20 mEq ER tablet 20 mEq, oral, Daily, Do not crush or chew.   • propylthiouracil (PTU) 100 mg, oral, 3 times daily   • traZODone (DESYREL) 50 mg, oral, Nightly PRN       Review of Systems   Constitutional:  Negative for fatigue, fever and unexpected weight change.   HENT:  Negative for trouble swallowing.    Eyes:  Negative for visual disturbance.   Respiratory:  Negative for shortness of breath.    Cardiovascular:  Negative for chest pain and palpitations.   Gastrointestinal:  Negative for abdominal pain, constipation, diarrhea, nausea and vomiting.   Endocrine: Negative for cold intolerance and heat intolerance.   Musculoskeletal:  Negative for neck pain.   Skin:  Negative for rash.   Neurological:  Negative for tremors, weakness and headaches.   Psychiatric/Behavioral:  The patient is not nervous/anxious.          Last Recorded Vitals  Blood pressure 160/69, pulse 76.    Physical Exam  Constitutional:       General: She is not in acute distress.  HENT:      Head: Normocephalic.      Mouth/Throat:      Mouth: Mucous membranes are moist.   Eyes:      Extraocular Movements: Extraocular movements intact.      Comments: No proptosis or periorbital edema   Neck:      Thyroid: No thyroid mass.      Comments: Thyroid upper limit normal size, no nodule, mobile with swallow.   Cardiovascular:      Pulses:           Radial pulses are 2+ on the right side and 2+ on the left side.   Musculoskeletal:      Right lower leg: No edema.       Left lower leg: No edema.   Lymphadenopathy:      Cervical: No cervical adenopathy.   Neurological:      Mental Status: She is alert.      Motor: No tremor.   Psychiatric:         Mood and Affect: Affect normal.          Relevant Results  Lab Results   Component Value Date    TSH <0.01 (L) 06/18/2025    FREET4 2.30 (H) 06/18/2025    T3FREE 9.9 (H) 03/25/2025    RECE 16.60 (H) 03/06/2025       CT ANGIO HEAD AND NECK W AND WO IV CONTRAST;  6/18/2025 2:26 am      INDICATION:  Signs/Symptoms:worst headache no hx headache 2200 left side temple.  no trauma violence      COMPARISON:  None.      ACCESSION NUMBER(S):  [id number]      ORDERING CLINICIAN:  RON MCGILL      TECHNIQUE:  Multiple contiguous axial noncontrast images of the head were  obtained. Following IV contrast administration of iodinated contrast,  a CT angiography of the head and neck was performed. MIPS and 3D  reconstructions of the [place] and neck were created on an  independent workstation and reviewed.      FINDINGS:  NON-CONTRAST HEAD CT:      BRAIN PARENCHYMA:  No evidence of acute intraparenchymal hemorrhage  or parenchymal evidence of an acute large territory ischemic infarct.  No mass-effect, midline shift or effacement of cerebral sulci.  Gray-white matter distinction is preserved.      VENTRICLES and EXTRA-AXIAL SPACES:  No acute extra-axial or  intraventricular hemorrhage. Ventricles and sulci are age-concordant.      PARANASAL SINUSES/MASTOIDS:  No hemorrhage or air-fluid levels within  the visualized paranasal sinuses. The mastoids are well aerated.      CALVARIUM/ORBITS:  No skull fracture.  The orbits and globes are  intact to the extent visualized.      EXTRACRANIAL SOFT TISSUES: No discernible abnormality.      CTA NECK:     LEFT VERTEBRAL ARTERY:  No hemodynamically significant stenosis,  occlusion, or dissection.      LEFT COMMON/INTERNAL CAROTID ARTERY:  No hemodynamically significant  stenosis, occlusion, or  dissection.      RIGHT VERTEBRAL ARTERY:  No hemodynamically significant stenosis,  occlusion, or dissection.      RIGHT COMMON/INTERNAL CAROTID ARTERY:  No hemodynamically significant  stenosis, occlusion, or dissection.          The neck soft tissues show no evidence of mass, fluid collection, or  enlarged lymph nodes.      There is no acute osseous abnormality.      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 This is on the bases  of associated diffuse thyroid enlargement raising the possibility of  Graves disease which is associated with enlarged thymus. Recommend  correlation with thyroid function testing.      CTA HEAD:      ANTERIOR CIRCULATION: No aneurysm of the anterior circulation.      - Internal Carotid Arteries:  No hemodynamically significant stenosis  or occlusion.      - Middle Cerebral Arteries:  No hemodynamically significant stenosis  or occlusion.      - Anterior Cerebral Arteries:  No hemodynamically significant  stenosis or occlusion.          POSTERIOR CIRCULATION: No aneurysm of the posterior circulation.      - Intracranial Vertebral Arteries:  No hemodynamically significant  stenosis or occlusion.      - Basilar Artery:  No hemodynamically significant stenosis or  occlusion.      - Posterior Cerebral Arteries:  No hemodynamically significant  stenosis or occlusion.      No arteriovenous malformation is visualized.  No pathologic intracranial enhancement or discrete mass.  The dural venous sinuses are patent.      IMPRESSION:  No acute intracranial abnormality.      No hemodynamically significant intracranial or extracranial stenosis,  occlusion, or aneurysm.      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 This is on the  bases  of associated diffuse thyroid enlargement raising the possibility of  Graves disease which is associated with enlarged thymus. Recommend  correlation with thyroid function testing. Recommend nonemergent  follow-up with full CT of the chest with contrast to completely  assess the anterior mediastinal soft tissue. YELLOW ALERT: An  incidental finding alert was sent per protocol.          MACRO:  Critical Finding:  See findings. Notification was initiated on  6/18/2025 at 3:13 am by  Asa Gamez.  (**-YCF-**) Instructions:      Signed by: Asa Gamez 6/18/2025 3:13 AM        IMPRESSION  HYPERTHYROIDISM  GRAVES DISEASE  No current symptoms  Free T4 elevated but much improved since this spring    THYMUS ENLARGEMENT  Incidental finding on CT angio neck  Family history of thymus tumors  No family history of myasthenia gravis      RECOMMENDATIONS  Increase PTU to 150 mg (3 pills) in the morning, 100 mg (2 pills) at noon, 150 mg (3 pills) in the evening    Follow up thymus enlargement and imaging with Dr. Begum.     Follow up 2-3 months  Draw TSH, free T4, CBC before next appointment.          [1]  Family History  Problem Relation Name Age of Onset   • Migraines Father     • Colon cancer Father     • Asthma Son     • Stroke Paternal Grandfather     • Diabetes Other Grandfather         [1]  Family History  Problem Relation Name Age of Onset   • Migraines Father     • Colon cancer Father     • Asthma Son     • Stroke Paternal Grandfather     • Diabetes Other Grandfather

## 2025-06-24 NOTE — PATIENT INSTRUCTIONS
RECOMMENDATIONS  Increase PTU to 150 mg (3 pills) in the morning, 100 mg (2 pills) at noon, 150 mg (3 pills) in the evening    Follow up thymus enlargement and imaging with Dr. Begum.     Follow up 2-3 months  Draw TSH, free T4, CBC before next appointment.

## 2025-06-24 NOTE — PROGRESS NOTES
History Of Present Illness  Christi Calzada is a 44 y.o. female hyperthyroidism, Graves' disease      mg TID    Weight loss stabilized      Allergy to methimazole:  itching, all over and between toes.  Hives on back.   Stopped methimazole 2 weeks ago.  She had taken it for 2.5 weeks.     ED visit to [facility] for headache.  Thymus enlargement noted on imaging.    Past Medical History  She has a past medical history of Acute lymphadenitis, unspecified (2014), Body mass index (BMI) 34.0-34.9, adult (2022), Body mass index (BMI)30.0-30.9, adult (2021), Candidiasis, unspecified (2014), Encounter for screening for lipoid disorders (2014), Encounter for screening for malignant neoplasm of vagina, Other conditions influencing health status, Other conditions influencing health status, Pain in right knee (2018), Paresthesia of skin (2014), Personal history of other diseases of the nervous system and sense organs (2013), and Radiculopathy, cervical region (2018).    She has no past medical history of Personal history of irradiation.    Surgical History  She has a past surgical history that includes Other surgical history (2020); Other surgical history (2020);  section, classic (2014); Other surgical history (2014); and Hernia repair (2014).     Social History  She reports that she has never smoked. She has been exposed to tobacco smoke. She has never used smokeless tobacco. She reports current alcohol use. She reports that she does not use drugs.    Family History  Family History[1]    Allergies  Patient has no known allergies.    Medications  Current Outpatient Medications   Medication Instructions    chlorthalidone (HYGROTON) 25 mg, oral, Daily    hydrOXYzine HCL (ATARAX) 25 mg, oral, Every 8 hours PRN    potassium chloride CR (Klor-Con M20) 20 mEq ER tablet 20 mEq, oral, Daily, Do not crush or chew.    propylthiouracil (PTU)  100 mg, oral, 3 times daily    traZODone (DESYREL) 50 mg, oral, Nightly PRN       Review of Systems   Constitutional:  Negative for fatigue, fever and unexpected weight change.   HENT:  Negative for trouble swallowing.    Eyes:  Negative for visual disturbance.   Respiratory:  Negative for shortness of breath.    Cardiovascular:  Negative for chest pain and palpitations.   Gastrointestinal:  Negative for abdominal pain, constipation, diarrhea, nausea and vomiting.   Endocrine: Negative for cold intolerance and heat intolerance.   Musculoskeletal:  Negative for neck pain.   Skin:  Negative for rash.   Neurological:  Negative for tremors, weakness and headaches.   Psychiatric/Behavioral:  The patient is not nervous/anxious.          Last Recorded Vitals  Blood pressure 160/69, pulse 76.    Physical Exam  Constitutional:       General: She is not in acute distress.  HENT:      Head: Normocephalic.      Mouth/Throat:      Mouth: Mucous membranes are moist.   Eyes:      Extraocular Movements: Extraocular movements intact.      Comments: No proptosis or periorbital edema   Neck:      Thyroid: No thyroid mass.      Comments: Thyroid upper limit normal size, no nodule, mobile with swallow.   Cardiovascular:      Pulses:           Radial pulses are 2+ on the right side and 2+ on the left side.   Musculoskeletal:      Right lower leg: No edema.      Left lower leg: No edema.   Lymphadenopathy:      Cervical: No cervical adenopathy.   Neurological:      Mental Status: She is alert.      Motor: No tremor.   Psychiatric:         Mood and Affect: Affect normal.          Relevant Results  Lab Results   Component Value Date    TSH <0.01 (L) 06/18/2025    FREET4 2.30 (H) 06/18/2025    T3FREE 9.9 (H) 03/25/2025    RECE 16.60 (H) 03/06/2025       CT ANGIO HEAD AND NECK W AND WO IV CONTRAST;  6/18/2025 2:26 am      INDICATION:  Signs/Symptoms:worst headache no hx headache 2200 left side temple.  no trauma violence       COMPARISON:  None.      ACCESSION NUMBER(S):  [id number]      ORDERING CLINICIAN:  RON MCGILL      TECHNIQUE:  Multiple contiguous axial noncontrast images of the head were  obtained. Following IV contrast administration of iodinated contrast,  a CT angiography of the head and neck was performed. MIPS and 3D  reconstructions of the [place] and neck were created on an  independent workstation and reviewed.      FINDINGS:  NON-CONTRAST HEAD CT:      BRAIN PARENCHYMA:  No evidence of acute intraparenchymal hemorrhage  or parenchymal evidence of an acute large territory ischemic infarct.  No mass-effect, midline shift or effacement of cerebral sulci.  Gray-white matter distinction is preserved.      VENTRICLES and EXTRA-AXIAL SPACES:  No acute extra-axial or  intraventricular hemorrhage. Ventricles and sulci are age-concordant.      PARANASAL SINUSES/MASTOIDS:  No hemorrhage or air-fluid levels within  the visualized paranasal sinuses. The mastoids are well aerated.      CALVARIUM/ORBITS:  No skull fracture.  The orbits and globes are  intact to the extent visualized.      EXTRACRANIAL SOFT TISSUES: No discernible abnormality.      CTA NECK:     LEFT VERTEBRAL ARTERY:  No hemodynamically significant stenosis,  occlusion, or dissection.      LEFT COMMON/INTERNAL CAROTID ARTERY:  No hemodynamically significant  stenosis, occlusion, or dissection.      RIGHT VERTEBRAL ARTERY:  No hemodynamically significant stenosis,  occlusion, or dissection.      RIGHT COMMON/INTERNAL CAROTID ARTERY:  No hemodynamically significant  stenosis, occlusion, or dissection.          The neck soft tissues show no evidence of mass, fluid collection, or  enlarged lymph nodes.      There is no acute osseous abnormality.      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 This is on the bases  of  associated diffuse thyroid enlargement raising the possibility of  Graves disease which is associated with enlarged thymus. Recommend  correlation with thyroid function testing.      CTA HEAD:      ANTERIOR CIRCULATION: No aneurysm of the anterior circulation.      - Internal Carotid Arteries:  No hemodynamically significant stenosis  or occlusion.      - Middle Cerebral Arteries:  No hemodynamically significant stenosis  or occlusion.      - Anterior Cerebral Arteries:  No hemodynamically significant  stenosis or occlusion.          POSTERIOR CIRCULATION: No aneurysm of the posterior circulation.      - Intracranial Vertebral Arteries:  No hemodynamically significant  stenosis or occlusion.      - Basilar Artery:  No hemodynamically significant stenosis or  occlusion.      - Posterior Cerebral Arteries:  No hemodynamically significant  stenosis or occlusion.      No arteriovenous malformation is visualized.  No pathologic intracranial enhancement or discrete mass.  The dural venous sinuses are patent.      IMPRESSION:  No acute intracranial abnormality.      No hemodynamically significant intracranial or extracranial stenosis,  occlusion, or aneurysm.      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 This is on the bases  of associated diffuse thyroid enlargement raising the possibility of  Graves disease which is associated with enlarged thymus. Recommend  correlation with thyroid function testing. Recommend nonemergent  follow-up with full CT of the chest with contrast to completely  assess the anterior mediastinal soft tissue. YELLOW ALERT: An  incidental finding alert was sent per protocol.          MACRO:  Critical Finding:  See findings. Notification was initiated on  6/18/2025 at 3:13 am by  Asa Gamez.  (**-YCF-**) Instructions:      Signed by: Asa Gamez  6/18/2025 3:13 AM        IMPRESSION  HYPERTHYROIDISM  GRAVES DISEASE  No current symptoms  Free T4 elevated but much improved since this spring    THYMUS ENLARGEMENT  Incidental finding on CT angio neck  Family history of thymus tumors  No family history of myasthenia gravis      RECOMMENDATIONS  Increase PTU to 150 mg (3 pills) in the morning, 100 mg (2 pills) at noon, 150 mg (3 pills) in the evening    Follow up thymus enlargement and imaging with Dr. Begum.     Follow up 2-3 months  Draw TSH, free T4, CBC before next appointment.          [1]   Family History  Problem Relation Name Age of Onset    Migraines Father      Colon cancer Father      Asthma Son      Stroke Paternal Grandfather      Diabetes Other Grandfather

## 2025-06-25 ENCOUNTER — TELEPHONE (OUTPATIENT)
Dept: PRIMARY CARE | Facility: CLINIC | Age: 45
End: 2025-06-25
Payer: COMMERCIAL

## 2025-06-26 ENCOUNTER — TELEPHONE (OUTPATIENT)
Dept: ENDOCRINOLOGY | Facility: CLINIC | Age: 45
End: 2025-06-26
Payer: COMMERCIAL

## 2025-06-26 NOTE — TELEPHONE ENCOUNTER
Patient called [facility] and is asking if you can put in the ct order because her pcp is out of office till end of July..

## 2025-06-26 NOTE — TELEPHONE ENCOUNTER
Patient called and asked if flma papers got filled out, I let patient know we did get her message and we will send her the papers in the mychart as soon as they get done, (we are working on them), and also sent over the request for the doctor to put in a order for the CT they talked about at appointment,

## 2025-06-27 ENCOUNTER — OFFICE VISIT (OUTPATIENT)
Dept: SURGERY | Facility: HOSPITAL | Age: 45
End: 2025-06-27
Payer: COMMERCIAL

## 2025-06-27 ENCOUNTER — APPOINTMENT (OUTPATIENT)
Dept: LAB | Facility: HOSPITAL | Age: 45
End: 2025-06-27
Payer: COMMERCIAL

## 2025-06-27 VITALS
RESPIRATION RATE: 18 BRPM | HEART RATE: 105 BPM | BODY MASS INDEX: 27.7 KG/M2 | TEMPERATURE: 98.4 F | SYSTOLIC BLOOD PRESSURE: 152 MMHG | DIASTOLIC BLOOD PRESSURE: 88 MMHG | WEIGHT: 146.61 LBS | OXYGEN SATURATION: 100 %

## 2025-06-27 DIAGNOSIS — J98.59 MEDIASTINAL MASS: ICD-10-CM

## 2025-06-27 DIAGNOSIS — J98.59 MEDIASTINAL MASS: Primary | ICD-10-CM

## 2025-06-27 LAB
AFP SERPL-MCNC: <4 NG/ML (ref 0–9)
BASOPHILS # BLD AUTO: 0.03 X10*3/UL (ref 0–0.1)
BASOPHILS NFR BLD AUTO: 0.5 %
COTININE UR QL SCN: NEGATIVE
EOSINOPHIL # BLD AUTO: 0.03 X10*3/UL (ref 0–0.7)
EOSINOPHIL NFR BLD AUTO: 0.5 %
ERYTHROCYTE [DISTWIDTH] IN BLOOD BY AUTOMATED COUNT: 13.7 % (ref 11.5–14.5)
HCT VFR BLD AUTO: 41.5 % (ref 36–46)
HGB BLD-MCNC: 13.9 G/DL (ref 12–16)
IMM GRANULOCYTES # BLD AUTO: 0.01 X10*3/UL (ref 0–0.7)
IMM GRANULOCYTES NFR BLD AUTO: 0.2 % (ref 0–0.9)
LDH SERPL L TO P-CCNC: 115 U/L (ref 84–246)
LYMPHOCYTES # BLD AUTO: 2.22 X10*3/UL (ref 1.2–4.8)
LYMPHOCYTES NFR BLD AUTO: 38.3 %
MCH RBC QN AUTO: 26.6 PG (ref 26–34)
MCHC RBC AUTO-ENTMCNC: 33.5 G/DL (ref 32–36)
MCV RBC AUTO: 80 FL (ref 80–100)
MONOCYTES # BLD AUTO: 0.36 X10*3/UL (ref 0.1–1)
MONOCYTES NFR BLD AUTO: 6.2 %
NEUTROPHILS # BLD AUTO: 3.14 X10*3/UL (ref 1.2–7.7)
NEUTROPHILS NFR BLD AUTO: 54.3 %
NRBC BLD-RTO: 0 /100 WBCS (ref 0–0)
PLATELET # BLD AUTO: 310 X10*3/UL (ref 150–450)
RBC # BLD AUTO: 5.22 X10*6/UL (ref 4–5.2)
T4 FREE SERPL-MCNC: 2.17 NG/DL (ref 0.78–1.48)
TSH SERPL-ACNC: 0.01 MIU/L (ref 0.44–3.98)
WBC # BLD AUTO: 5.8 X10*3/UL (ref 4.4–11.3)

## 2025-06-27 PROCEDURE — 83519 RIA NONANTIBODY: CPT | Performed by: THORACIC SURGERY (CARDIOTHORACIC VASCULAR SURGERY)

## 2025-06-27 PROCEDURE — 99215 OFFICE O/P EST HI 40 MIN: CPT | Mod: 25 | Performed by: THORACIC SURGERY (CARDIOTHORACIC VASCULAR SURGERY)

## 2025-06-27 PROCEDURE — 3079F DIAST BP 80-89 MM HG: CPT | Performed by: THORACIC SURGERY (CARDIOTHORACIC VASCULAR SURGERY)

## 2025-06-27 PROCEDURE — 84443 ASSAY THYROID STIM HORMONE: CPT | Performed by: INTERNAL MEDICINE

## 2025-06-27 PROCEDURE — 99205 OFFICE O/P NEW HI 60 MIN: CPT | Performed by: THORACIC SURGERY (CARDIOTHORACIC VASCULAR SURGERY)

## 2025-06-27 PROCEDURE — 83615 LACTATE (LD) (LDH) ENZYME: CPT | Performed by: THORACIC SURGERY (CARDIOTHORACIC VASCULAR SURGERY)

## 2025-06-27 PROCEDURE — 82105 ALPHA-FETOPROTEIN SERUM: CPT | Performed by: INTERNAL MEDICINE

## 2025-06-27 PROCEDURE — 85025 COMPLETE CBC W/AUTO DIFF WBC: CPT | Performed by: INTERNAL MEDICINE

## 2025-06-27 PROCEDURE — 3077F SYST BP >= 140 MM HG: CPT | Performed by: THORACIC SURGERY (CARDIOTHORACIC VASCULAR SURGERY)

## 2025-06-27 PROCEDURE — 84439 ASSAY OF FREE THYROXINE: CPT | Performed by: INTERNAL MEDICINE

## 2025-06-27 PROCEDURE — 36415 COLL VENOUS BLD VENIPUNCTURE: CPT

## 2025-06-27 ASSESSMENT — PAIN SCALES - GENERAL: PAINLEVEL_OUTOF10: 0-NO PAIN

## 2025-06-29 LAB — ACHR BIND AB SER-SCNC: 0.1 NMOL/L (ref 0–0.4)

## 2025-06-30 LAB — MUSK AB SER QL: NORMAL

## 2025-06-30 ASSESSMENT — ENCOUNTER SYMPTOMS
EYES NEGATIVE: 1
NEUROLOGICAL NEGATIVE: 1
PSYCHIATRIC NEGATIVE: 1
ALLERGIC/IMMUNOLOGIC NEGATIVE: 1
CONSTIPATION: 0
HEMATOLOGIC/LYMPHATIC NEGATIVE: 1
APPETITE CHANGE: 0
FATIGUE: 0
ABDOMINAL PAIN: 0
FEVER: 0
COUGH: 0
MUSCULOSKELETAL NEGATIVE: 1
SHORTNESS OF BREATH: 0
VOMITING: 0
PALPITATIONS: 0
DIAPHORESIS: 0
WHEEZING: 0
CHOKING: 0
STRIDOR: 0
ENDOCRINE NEGATIVE: 1
CHEST TIGHTNESS: 0
UNEXPECTED WEIGHT CHANGE: 0
CHILLS: 0
ABDOMINAL DISTENTION: 0
DIARRHEA: 0
NAUSEA: 0

## 2025-06-30 NOTE — PROGRESS NOTES
Subjective   Patient ID: Christi Calzada is a 44 y.o. female who presents for No chief complaint on file..  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  This is a soft tissue mass.  She is a never smoker.  She does not have any signs or symptoms of myasthenia gravis.  She has not have any B symptoms.  No weight loss.  Her grandfather had an anterior mediastinal mass that was resected and was associated to myasthenia gravis.  I discussion with her about next steps.  Will obtain an MRI of the chest to really characterize this mass better and see the entirety of the chest which is not possible with the neck CT.  Will obtain blood work.  I will see her again with results.    Review of Systems   Constitutional:  Negative for appetite change, chills, diaphoresis, fatigue, fever and unexpected weight change.   HENT: Negative.     Eyes: Negative.    Respiratory:  Negative for cough, choking, chest tightness, shortness of breath, wheezing and stridor.    Cardiovascular:  Negative for chest pain, palpitations and leg swelling.   Gastrointestinal:  Negative for abdominal distention, abdominal pain, constipation, diarrhea, nausea and vomiting.   Endocrine: Negative.    Genitourinary: Negative.    Musculoskeletal: Negative.    Skin: Negative.    Allergic/Immunologic: Negative.    Neurological: Negative.    Hematological: Negative.    Psychiatric/Behavioral: Negative.     All other systems reviewed and are negative.      Objective   Physical Exam  Constitutional:       Appearance: Normal appearance.   HENT:      Head: Normocephalic and atraumatic.      Nose: Nose normal.      Mouth/Throat:      Mouth: Mucous membranes are moist.      Pharynx: Oropharynx is clear.   Eyes:      Extraocular Movements: Extraocular movements intact.      Conjunctiva/sclera: Conjunctivae normal.      Pupils: Pupils are equal,  round, and reactive to light.   Cardiovascular:      Rate and Rhythm: Normal rate and regular rhythm.      Pulses: Normal pulses.      Heart sounds: Normal heart sounds.   Pulmonary:      Effort: Pulmonary effort is normal. No respiratory distress.      Breath sounds: Normal breath sounds. No stridor. No wheezing, rhonchi or rales.   Chest:      Chest wall: No tenderness.   Abdominal:      General: Abdomen is flat. Bowel sounds are normal.      Palpations: Abdomen is soft.   Musculoskeletal:         General: Normal range of motion.      Cervical back: Normal range of motion and neck supple.   Skin:     General: Skin is warm and dry.      Capillary Refill: Capillary refill takes less than 2 seconds.   Neurological:      General: No focal deficit present.      Mental Status: She is alert and oriented to person, place, and time.         Assessment/Plan   Diagnoses and all orders for this visit:  Mediastinal mass  Chest MRI  Mediastinal mass panel         Suleman Jones MD 06/30/25 9:24 AM

## 2025-07-01 ENCOUNTER — APPOINTMENT (OUTPATIENT)
Dept: SURGERY | Facility: CLINIC | Age: 45
End: 2025-07-01
Payer: COMMERCIAL

## 2025-07-02 ENCOUNTER — APPOINTMENT (OUTPATIENT)
Dept: SLEEP MEDICINE | Facility: CLINIC | Age: 45
End: 2025-07-02
Payer: COMMERCIAL

## 2025-07-03 ENCOUNTER — APPOINTMENT (OUTPATIENT)
Dept: RADIOLOGY | Facility: HOSPITAL | Age: 45
End: 2025-07-03
Payer: COMMERCIAL

## 2025-07-03 PROCEDURE — RXMED WILLOW AMBULATORY MEDICATION CHARGE

## 2025-07-11 ENCOUNTER — APPOINTMENT (OUTPATIENT)
Dept: ENDOCRINOLOGY | Facility: CLINIC | Age: 45
End: 2025-07-11
Payer: COMMERCIAL

## 2025-07-17 ENCOUNTER — PHARMACY VISIT (OUTPATIENT)
Dept: PHARMACY | Facility: CLINIC | Age: 45
End: 2025-07-17
Payer: COMMERCIAL

## 2025-07-17 PROCEDURE — RXMED WILLOW AMBULATORY MEDICATION CHARGE

## 2025-07-23 ENCOUNTER — PATIENT OUTREACH (OUTPATIENT)
Dept: CARE COORDINATION | Facility: CLINIC | Age: 45
End: 2025-07-23
Payer: COMMERCIAL

## 2025-07-23 NOTE — PROGRESS NOTES
Patient interested in housing that's not based on income. Patient agreed to receive resource information via email. Patient agreed to enroll in [facility]  [address]  [place] [address]  [phone]    Family Housing Stability [phone]  Aug. 4, 2025 @   [email or web address]    [employer]   [address]  [place] [id number]  [phone]    [facility]  [address] [place] [address]  [phone]

## 2025-07-28 ENCOUNTER — APPOINTMENT (OUTPATIENT)
Dept: RADIOLOGY | Facility: HOSPITAL | Age: 45
End: 2025-07-28
Payer: COMMERCIAL

## 2025-07-28 VITALS — BODY MASS INDEX: 27.4 KG/M2 | WEIGHT: 145 LBS

## 2025-07-28 DIAGNOSIS — J98.59 MEDIASTINAL MASS: ICD-10-CM

## 2025-07-28 PROCEDURE — A9575 INJ GADOTERATE MEGLUMI 0.1ML: HCPCS | Performed by: THORACIC SURGERY (CARDIOTHORACIC VASCULAR SURGERY)

## 2025-07-28 PROCEDURE — 71552 MRI CHEST W/O & W/DYE: CPT | Performed by: RADIOLOGY

## 2025-07-28 PROCEDURE — 71552 MRI CHEST W/O & W/DYE: CPT

## 2025-07-28 PROCEDURE — 2550000001 HC RX 255 CONTRASTS: Performed by: THORACIC SURGERY (CARDIOTHORACIC VASCULAR SURGERY)

## 2025-07-28 RX ORDER — GADOTERATE MEGLUMINE 376.9 MG/ML
12 INJECTION INTRAVENOUS
Status: COMPLETED | OUTPATIENT
Start: 2025-07-28 | End: 2025-07-28

## 2025-07-28 RX ADMIN — GADOTERATE MEGLUMINE 12 ML: 376.9 INJECTION INTRAVENOUS at 12:28

## 2025-08-08 ENCOUNTER — TELEMEDICINE (OUTPATIENT)
Dept: SURGERY | Facility: HOSPITAL | Age: 45
End: 2025-08-08
Payer: COMMERCIAL

## 2025-08-08 DIAGNOSIS — J98.59 MEDIASTINAL MASS: Primary | ICD-10-CM

## 2025-08-08 DIAGNOSIS — J98.59 MEDIASTINAL MASS: ICD-10-CM

## 2025-08-08 PROCEDURE — 99215 OFFICE O/P EST HI 40 MIN: CPT | Performed by: THORACIC SURGERY (CARDIOTHORACIC VASCULAR SURGERY)

## 2025-08-08 ASSESSMENT — ENCOUNTER SYMPTOMS
UNEXPECTED WEIGHT CHANGE: 0
PSYCHIATRIC NEGATIVE: 1
NEUROLOGICAL NEGATIVE: 1
STRIDOR: 0
ABDOMINAL DISTENTION: 0
APPETITE CHANGE: 0
DIAPHORESIS: 0
ALLERGIC/IMMUNOLOGIC NEGATIVE: 1
CHILLS: 0
FATIGUE: 0
CONSTIPATION: 0
FEVER: 0
NAUSEA: 0
EYES NEGATIVE: 1
WHEEZING: 0
SHORTNESS OF BREATH: 0
DIARRHEA: 0
CHOKING: 0
PALPITATIONS: 0
COUGH: 0
CHEST TIGHTNESS: 0
ENDOCRINE NEGATIVE: 1
MUSCULOSKELETAL NEGATIVE: 1
HEMATOLOGIC/LYMPHATIC NEGATIVE: 1
VOMITING: 0
ABDOMINAL PAIN: 0

## 2025-08-08 NOTE — PROGRESS NOTES
Subjective   Patient ID: Christi Calzada is a 44 y.o. female who presents for No chief complaint on file..  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  This is a soft tissue mass.  She is a never smoker.  She does not have any signs or symptoms of myasthenia gravis.  She has not have any B symptoms.  No weight loss.  Her grandfather had an anterior mediastinal mass that was resected and was associated to myasthenia gravis.  I discussion with her about next steps.  Will obtain an MRI of the chest to really characterize this mass better and see the entirety of the chest which is not possible with the neck CT.  Will obtain blood work.  I will see her again with results.    Update 8/8/2025  Virtual or Telephone Consent    An interactive audio and video telecommunication system which permits real time communications between the patient (at the originating site) and provider (at the distant site) was utilized to provide this telehealth service.   Verbal consent was requested and obtained from Christi Calzada on this date, 08/08/25 for a telehealth visit and the patient's location was confirmed at the time of the visit.    Doing well.  No symptoms.  Labs unremarkable.  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  We will plan on keeping a close eye with a CT scan in 6 months.    Review of Systems   Constitutional:  Negative for appetite change, chills, diaphoresis, fatigue, fever and unexpected weight change.   HENT: Negative.     Eyes: Negative.    Respiratory:  Negative for cough, choking, chest tightness, shortness of breath, wheezing and stridor.    Cardiovascular:  Negative for chest pain, palpitations and leg swelling.   Gastrointestinal:  Negative for abdominal distention, abdominal pain, constipation, diarrhea, nausea and vomiting.   Endocrine: Negative.    Genitourinary: Negative.     Musculoskeletal: Negative.    Skin: Negative.    Allergic/Immunologic: Negative.    Neurological: Negative.    Hematological: Negative.    Psychiatric/Behavioral: Negative.     All other systems reviewed and are negative.      Objective   Physical Exam  Constitutional:       Appearance: Normal appearance.   HENT:      Head: Normocephalic and atraumatic.      Nose: Nose normal.      Mouth/Throat:      Mouth: Mucous membranes are moist.      Pharynx: Oropharynx is clear.     Eyes:      Extraocular Movements: Extraocular movements intact.      Conjunctiva/sclera: Conjunctivae normal.      Pupils: Pupils are equal, round, and reactive to light.       Cardiovascular:      Rate and Rhythm: Normal rate and regular rhythm.      Pulses: Normal pulses.      Heart sounds: Normal heart sounds.   Pulmonary:      Effort: Pulmonary effort is normal. No respiratory distress.      Breath sounds: Normal breath sounds. No stridor. No wheezing, rhonchi or rales.   Chest:      Chest wall: No tenderness.   Abdominal:      General: Abdomen is flat. Bowel sounds are normal.      Palpations: Abdomen is soft.     Musculoskeletal:         General: Normal range of motion.      Cervical back: Normal range of motion and neck supple.     Skin:     General: Skin is warm and dry.      Capillary Refill: Capillary refill takes less than 2 seconds.     Neurological:      General: No focal deficit present.      Mental Status: She is alert and oriented to person, place, and time.         Assessment/Plan   Diagnoses and all orders for this visit:  Mediastinal mass    Chest CT in 6 months         Suleman Osorio MD 08/08/25 11:28 AM

## 2025-08-11 PROCEDURE — RXMED WILLOW AMBULATORY MEDICATION CHARGE

## 2025-08-15 DIAGNOSIS — E05.90 HYPERTHYROIDISM: ICD-10-CM

## 2025-08-21 ENCOUNTER — PHARMACY VISIT (OUTPATIENT)
Dept: PHARMACY | Facility: CLINIC | Age: 45
End: 2025-08-21
Payer: COMMERCIAL

## 2025-08-22 ENCOUNTER — TELEPHONE (OUTPATIENT)
Dept: OBSTETRICS AND GYNECOLOGY | Facility: CLINIC | Age: 45
End: 2025-08-22
Payer: COMMERCIAL

## 2025-08-22 ENCOUNTER — PHARMACY VISIT (OUTPATIENT)
Dept: PHARMACY | Facility: CLINIC | Age: 45
End: 2025-08-22
Payer: COMMERCIAL

## 2025-08-22 DIAGNOSIS — N89.8 VAGINAL IRRITATION: Primary | ICD-10-CM

## 2025-08-22 PROCEDURE — RXMED WILLOW AMBULATORY MEDICATION CHARGE

## 2025-08-22 RX ORDER — FLUCONAZOLE 150 MG/1
150 TABLET ORAL ONCE
Qty: 1 TABLET | Refills: 0 | Status: SHIPPED | OUTPATIENT
Start: 2025-08-22 | End: 2025-08-23

## 2025-09-02 ENCOUNTER — APPOINTMENT (OUTPATIENT)
Dept: OBSTETRICS AND GYNECOLOGY | Facility: CLINIC | Age: 45
End: 2025-09-02
Payer: COMMERCIAL

## 2025-09-05 ENCOUNTER — APPOINTMENT (OUTPATIENT)
Dept: OBSTETRICS AND GYNECOLOGY | Facility: CLINIC | Age: 45
End: 2025-09-05
Payer: COMMERCIAL

## 2025-09-05 SDOH — ECONOMIC STABILITY: FOOD INSECURITY: WITHIN THE PAST 12 MONTHS, YOU WORRIED THAT YOUR FOOD WOULD RUN OUT BEFORE YOU GOT MONEY TO BUY MORE.: NEVER TRUE

## 2025-09-05 SDOH — ECONOMIC STABILITY: FOOD INSECURITY: WITHIN THE PAST 12 MONTHS, THE FOOD YOU BOUGHT JUST DIDN'T LAST AND YOU DIDN'T HAVE MONEY TO GET MORE.: NEVER TRUE

## 2025-09-05 ASSESSMENT — SOCIAL DETERMINANTS OF HEALTH (SDOH)
WITHIN THE LAST YEAR, HAVE YOU BEEN KICKED, HIT, SLAPPED, OR OTHERWISE PHYSICALLY HURT BY YOUR PARTNER OR EX-PARTNER?: NO
WITHIN THE LAST YEAR, HAVE YOU BEEN AFRAID OF YOUR PARTNER OR EX-PARTNER?: NO
WITHIN THE LAST YEAR, HAVE YOU BEEN HUMILIATED OR EMOTIONALLY ABUSED IN OTHER WAYS BY YOUR PARTNER OR EX-PARTNER?: NO
WITHIN THE LAST YEAR, HAVE TO BEEN RAPED OR FORCED TO HAVE ANY KIND OF SEXUAL ACTIVITY BY YOUR PARTNER OR EX-PARTNER?: NO

## 2025-09-05 ASSESSMENT — PAIN SCALES - GENERAL: PAINLEVEL_OUTOF10: 0-NO PAIN

## 2025-09-05 ASSESSMENT — COLUMBIA-SUICIDE SEVERITY RATING SCALE - C-SSRS
6. HAVE YOU EVER DONE ANYTHING, STARTED TO DO ANYTHING, OR PREPARED TO DO ANYTHING TO END YOUR LIFE?: NO
1. IN THE PAST MONTH, HAVE YOU WISHED YOU WERE DEAD OR WISHED YOU COULD GO TO SLEEP AND NOT WAKE UP?: NO
2. HAVE YOU ACTUALLY HAD ANY THOUGHTS OF KILLING YOURSELF?: NO

## 2025-09-24 ENCOUNTER — APPOINTMENT (OUTPATIENT)
Dept: PRIMARY CARE | Facility: CLINIC | Age: 45
End: 2025-09-24
Payer: COMMERCIAL